# Patient Record
Sex: FEMALE | Race: WHITE | NOT HISPANIC OR LATINO | Employment: OTHER | URBAN - METROPOLITAN AREA
[De-identification: names, ages, dates, MRNs, and addresses within clinical notes are randomized per-mention and may not be internally consistent; named-entity substitution may affect disease eponyms.]

---

## 2019-03-05 NOTE — H&P (VIEW-ONLY)
Electrophysiology Consult     Tania Schreiber  1940  [unfilled]  [unfilled]    03/06/19    DATE OF ADMISSION: (Not on file)  [facility] CARDIOLOGY    Madera, Joe Renteria MD  [address] [place] [id number]    Chief Complaint   Patient presents with   • Atrial Fibrillation       Problem List:  1. Persistent atrial fibrillation  a. CHADSVASc = 4 (age >75, female, HTN) on Eliquis  b. S/p ISABELA/ECV, 10/2016  c. ISABELA 10/27/16: EF 45-50%, mild to moderate MR, moderate TR  d. Previously treated with amiodarone, discontinued secondary to thyroid toxicity  e. Echocardiogram 4/12/17: EF 60-65%, no significant valvular disease  2. Abnormal MPS:  a. MPS 10/24/16: EF 63%, medium, completely reversible, mid to basal-anteroseptal defect  b. Left heart catheterization 10/25/16: No significant coronary artery disease  3. Hypertension  4. Hyperlipidemia  5. Hypothyroidism  6. GERD  7. Surgical history:  a. Left knee surgery  b. Bilateral cataract surgery  c. Jaw tumor removal  d. Tonsillectomy  e. Cholecystectomy      History of Present Illness:  Patient is an 78 year old  female with the above noted past medical history who presents today in consultation by referral from Dr. Argueta for further evaluation and management of persistent atrial fibrillation.  She is s/p ECV in 2016 and was treated with amiodarone in the past.  This was discontinued secondary to thyroid toxicity.  She has now been treated with Multaq but continues to have episodes of symptomatic atrial fibrillation.  Symptoms include palpitations, pressure in the chest, fatigue and dyspnea on exertion, described as moderate to severe in nature, exacerbated by activity, and relieved by rest and restoration of normal rhythm.  Believes she has been out of rhythm for at least the last week and a half.  Of note, most recent echocardiogram in 2017 showing normal EF and no significant valvular disease.  LHC in 2016 showing no coronary  artery disease.  Anticoagulated with Eliquis with no bleeding issues or hx of TIA/CVA.  She is a non-smoker, 2 cups of coffee, 1 soda daily, no ETOH.  She reports stable thyroid levels on meds.  Reports labile BP's lately.  Dizziness with low BP's, no syncope.  No hx of ABHINAV - reports negative sleep study in the past.       Allergies   Allergen Reactions   • Diphenhydramine Other (See Comments)        Cannot display prior to admission medications because the patient has not been admitted in this contact.            Current Outpatient Medications:   •  ALPRAZolam (XANAX) 0.5 MG tablet, Take 0.5 mg by mouth 2 (Two) Times a Day As Needed for Anxiety., Disp: , Rfl:   •  apixaban (ELIQUIS) 5 MG tablet tablet, Take 5 mg by mouth 2 (Two) Times a Day., Disp: , Rfl:   •  atorvastatin (LIPITOR) 20 MG tablet, Take 20 mg by mouth Daily., Disp: , Rfl:   •  doxazosin (CARDURA) 4 MG tablet, Take 4 mg by mouth Every Night., Disp: , Rfl:   •  dronedarone (MULTAQ) 400 MG tablet, Take 400 mg by mouth 2 (Two) Times a Day With Meals., Disp: , Rfl:   •  hydrochlorothiazide (HYDRODIURIL) 12.5 MG tablet, Take 12.5 mg by mouth 2 (Two) Times a Day., Disp: , Rfl:   •  lansoprazole (PREVACID) 30 MG capsule, Take 30 mg by mouth Daily., Disp: , Rfl:   •  levothyroxine (SYNTHROID, LEVOTHROID) 50 MCG tablet, Take 50 mcg by mouth Daily., Disp: , Rfl:   •  lisinopril (PRINIVIL,ZESTRIL) 20 MG tablet, Take 30 mg by mouth 2 (Two) Times a Day., Disp: , Rfl:   •  metoprolol tartrate (LOPRESSOR) 25 MG tablet, Take 12.5 mg by mouth 2 (Two) Times a Day., Disp: , Rfl:   •  Multiple Vitamins-Minerals (PRESERVISION AREDS PO), Take 2 tablets by mouth Daily., Disp: , Rfl:     Social History     Socioeconomic History   • Marital status:      Spouse name: Not on file   • Number of children: Not on file   • Years of education: Not on file   • Highest education level: Not on file   Tobacco Use   • Smoking status: Never Smoker   Substance and Sexual Activity  "  • Alcohol use: No     Frequency: Never   • Drug use: No     Family History:  Mother: , [place]  Father: , [place]      REVIEW OF SYSTEMS:   CONST:  No weight loss, fever, chills, weakness + fatigue.   HEENT:  No visual loss, blurred vision, double vision, yellow sclerae.                   No hearing loss, congestion, sore throat.   SKIN:      No rashes, urticaria, ulcers, sores.     RESP:     + shortness of breath, hemoptysis, cough, sputum.   GI:           No anorexia, nausea, vomiting, diarrhea. No abdominal pain, melena.   :         No burning on urination, hematuria or increased frequency.  ENDO:    No diaphoresis, cold or heat intolerance. No polyuria or polydipsia.   NEURO:  No headache, + dizziness, no syncope, paralysis, ataxia, or parasthesias.                  No change in bowel or bladder control. No history of CVA/TIA  MUSC:    No muscle, back pain, joint pain or stiffness.   HEME:    No anemia, bleeding, bruising. No history of DVT/PE.  PSYCH:  No history of depression, anxiety    Vitals:    19 1302   BP: 122/72   BP Location: Right arm   Patient Position: Sitting   Pulse: 103   Weight: 69.9 kg (154 lb)   Height: 160 cm (63\")                 Physical Exam:  GEN: Well nourished, well-developed, no acute distress  HEENT: Normocephalic, atraumatic, PERRLA, moist mucous membranes  NECK: Supple, NO JVD, no thyromegaly, no lymphadenopathy  CARD: S1S2, irreg irreg rate and rhythm, 3/6 systolic ejection murmur, no gallop, rub, PMI NL  LUNGS: Clear to auscultation, normal respiratory effort  ABDOMEN: Soft, nontender, normal bowel sounds  EXTREMITIES: No gross deformities, no clubbing, cyanosis, or edema  SKIN: Warm, dry, no lesions  NEURO: No focal deficits, alert and oriented x 3  PSYCHIATRIC: Normal affect and mood      I personally viewed and interpreted the patient's EKG/Telemetry/lab data      ECG 12 Lead  Date/Time: 3/6/2019 1:16 PM  Performed by: Parth Almazan MD  Authorized by: " Parth Amlazan MD   Comparison: not compared with previous ECG   Previous ECG: no previous ECG available  Rhythm: atrial fibrillation  BPM: 103                ICD-10-CM ICD-9-CM   1. Persistent atrial fibrillation (CMS/HCC) I48.1 427.31   2. Essential hypertension I10 401.9   3. VHD (valvular heart disease) I38 424.90       Assessment and Plan:   1. Persistent atrial fibrillation:  - Recurrent, symptomatic persistent atrial fibrillation with RVR.  She had side effects and thyroid toxicity to amiodarone and has failed medical therapy with Multaq.  Options include Tikosyn versus AVN/PPM implant.  Discussed probable success rates and need for hospitalization with Tikosyn +/- ECV on the last day.  She will check on cost of Tikosyn and let us know.    - CHADSVASc = 4, on Eliquis  - Will repeat echo today as last echo 2 years ago and murmur noted today.    2. HTN:  - Frequent, low BP's at home with symptoms  - Stop Cardura    3. VHD: needs echo    Scribed for Parth Almazan MD by Netta Winston, APRN. 3/6/2019  1:49 PM     I, Parth Almazan MD, personally performed the services described in this documentation as scribed by the above named individual in my presence, and it is both accurate and complete.  3/6/2019  1:49 PM

## 2019-03-05 NOTE — PROGRESS NOTES
Electrophysiology Consult     Tania Schreiber  1940  [unfilled]  [unfilled]    03/06/19    DATE OF ADMISSION: (Not on file)  [facility] CARDIOLOGY    Madera, Joe Renteria MD  [address] [place] [address]    Chief Complaint   Patient presents with   • Atrial Fibrillation       Problem List:  1. Persistent atrial fibrillation  a. CHADSVASc = 4 (age >75, female, HTN) on Eliquis  b. S/p ISABELA/ECV, 10/2016  c. ISABELA 10/27/16: EF 45-50%, mild to moderate MR, moderate TR  d. Previously treated with amiodarone, discontinued secondary to thyroid toxicity  e. Echocardiogram 4/12/17: EF 60-65%, no significant valvular disease  2. Abnormal MPS:  a. MPS 10/24/16: EF 63%, medium, completely reversible, mid to basal-anteroseptal defect  b. Left heart catheterization 10/25/16: No significant coronary artery disease  3. Hypertension  4. Hyperlipidemia  5. Hypothyroidism  6. GERD  7. Surgical history:  a. Left knee surgery  b. Bilateral cataract surgery  c. Jaw tumor removal  d. Tonsillectomy  e. Cholecystectomy      History of Present Illness:  Patient is an 78 year old  female with the above noted past medical history who presents today in consultation by referral from Dr. Argueta for further evaluation and management of persistent atrial fibrillation.  She is s/p ECV in 2016 and was treated with amiodarone in the past.  This was discontinued secondary to thyroid toxicity.  She has now been treated with Multaq but continues to have episodes of symptomatic atrial fibrillation.  Symptoms include palpitations, pressure in the chest, fatigue and dyspnea on exertion, described as moderate to severe in nature, exacerbated by activity, and relieved by rest and restoration of normal rhythm.  Believes she has been out of rhythm for at least the last week and a half.  Of note, most recent echocardiogram in 2017 showing normal EF and no significant valvular disease.  LHC in 2016 showing no coronary  artery disease.  Anticoagulated with Eliquis with no bleeding issues or hx of TIA/CVA.  She is a non-smoker, 2 cups of coffee, 1 soda daily, no ETOH.  She reports stable thyroid levels on meds.  Reports labile BP's lately.  Dizziness with low BP's, no syncope.  No hx of ABHINAV - reports negative sleep study in the past.       Allergies   Allergen Reactions   • Diphenhydramine Other (See Comments)        Cannot display prior to admission medications because the patient has not been admitted in this contact.            Current Outpatient Medications:   •  ALPRAZolam (XANAX) 0.5 MG tablet, Take 0.5 mg by mouth 2 (Two) Times a Day As Needed for Anxiety., Disp: , Rfl:   •  apixaban (ELIQUIS) 5 MG tablet tablet, Take 5 mg by mouth 2 (Two) Times a Day., Disp: , Rfl:   •  atorvastatin (LIPITOR) 20 MG tablet, Take 20 mg by mouth Daily., Disp: , Rfl:   •  doxazosin (CARDURA) 4 MG tablet, Take 4 mg by mouth Every Night., Disp: , Rfl:   •  dronedarone (MULTAQ) 400 MG tablet, Take 400 mg by mouth 2 (Two) Times a Day With Meals., Disp: , Rfl:   •  hydrochlorothiazide (HYDRODIURIL) 12.5 MG tablet, Take 12.5 mg by mouth 2 (Two) Times a Day., Disp: , Rfl:   •  lansoprazole (PREVACID) 30 MG capsule, Take 30 mg by mouth Daily., Disp: , Rfl:   •  levothyroxine (SYNTHROID, LEVOTHROID) 50 MCG tablet, Take 50 mcg by mouth Daily., Disp: , Rfl:   •  lisinopril (PRINIVIL,ZESTRIL) 20 MG tablet, Take 30 mg by mouth 2 (Two) Times a Day., Disp: , Rfl:   •  metoprolol tartrate (LOPRESSOR) 25 MG tablet, Take 12.5 mg by mouth 2 (Two) Times a Day., Disp: , Rfl:   •  Multiple Vitamins-Minerals (PRESERVISION AREDS PO), Take 2 tablets by mouth Daily., Disp: , Rfl:     Social History     Socioeconomic History   • Marital status:      Spouse name: Not on file   • Number of children: Not on file   • Years of education: Not on file   • Highest education level: Not on file   Tobacco Use   • Smoking status: Never Smoker   Substance and Sexual Activity  "  • Alcohol use: No     Frequency: Never   • Drug use: No     Family History:  Mother: , [place]  Father: , [place]      REVIEW OF SYSTEMS:   CONST:  No weight loss, fever, chills, weakness + fatigue.   HEENT:  No visual loss, blurred vision, double vision, yellow sclerae.                   No hearing loss, congestion, sore throat.   SKIN:      No rashes, urticaria, ulcers, sores.     RESP:     + shortness of breath, hemoptysis, cough, sputum.   GI:           No anorexia, nausea, vomiting, diarrhea. No abdominal pain, melena.   :         No burning on urination, hematuria or increased frequency.  ENDO:    No diaphoresis, cold or heat intolerance. No polyuria or polydipsia.   NEURO:  No headache, + dizziness, no syncope, paralysis, ataxia, or parasthesias.                  No change in bowel or bladder control. No history of CVA/TIA  MUSC:    No muscle, back pain, joint pain or stiffness.   HEME:    No anemia, bleeding, bruising. No history of DVT/PE.  PSYCH:  No history of depression, anxiety    Vitals:    19 1302   BP: 122/72   BP Location: Right arm   Patient Position: Sitting   Pulse: 103   Weight: 69.9 kg (154 lb)   Height: 160 cm (63\")                 Physical Exam:  GEN: Well nourished, well-developed, no acute distress  HEENT: Normocephalic, atraumatic, PERRLA, moist mucous membranes  NECK: Supple, NO JVD, no thyromegaly, no lymphadenopathy  CARD: S1S2, irreg irreg rate and rhythm, 3/6 systolic ejection murmur, no gallop, rub, PMI NL  LUNGS: Clear to auscultation, normal respiratory effort  ABDOMEN: Soft, nontender, normal bowel sounds  EXTREMITIES: No gross deformities, no clubbing, cyanosis, or edema  SKIN: Warm, dry, no lesions  NEURO: No focal deficits, alert and oriented x 3  PSYCHIATRIC: Normal affect and mood      I personally viewed and interpreted the patient's EKG/Telemetry/lab data      ECG 12 Lead  Date/Time: 3/6/2019 1:16 PM  Performed by: Parth Almazan MD  Authorized by: " Parth Almazan MD   Comparison: not compared with previous ECG   Previous ECG: no previous ECG available  Rhythm: atrial fibrillation  BPM: 103                ICD-10-CM ICD-9-CM   1. Persistent atrial fibrillation (CMS/HCC) I48.1 427.31   2. Essential hypertension I10 401.9   3. VHD (valvular heart disease) I38 424.90       Assessment and Plan:   1. Persistent atrial fibrillation:  - Recurrent, symptomatic persistent atrial fibrillation with RVR.  She had side effects and thyroid toxicity to amiodarone and has failed medical therapy with Multaq.  Options include Tikosyn versus AVN/PPM implant.  Discussed probable success rates and need for hospitalization with Tikosyn +/- ECV on the last day.  She will check on cost of Tikosyn and let us know.    - CHADSVASc = 4, on Eliquis  - Will repeat echo today as last echo 2 years ago and murmur noted today.    2. HTN:  - Frequent, low BP's at home with symptoms  - Stop Cardura    3. VHD: needs echo    Scribed for Parth Almazan MD by Netta Winston, APRN. 3/6/2019  1:49 PM     I, Parth Almazan MD, personally performed the services described in this documentation as scribed by the above named individual in my presence, and it is both accurate and complete.  3/6/2019  1:49 PM

## 2019-03-06 ENCOUNTER — HOSPITAL ENCOUNTER (OUTPATIENT)
Dept: CARDIOLOGY | Facility: HOSPITAL | Age: 79
Discharge: HOME | End: 2019-03-06
Admitting: NURSE PRACTITIONER

## 2019-03-06 ENCOUNTER — CONSULT (OUTPATIENT)
Dept: CARDIOLOGY | Facility: CLINIC | Age: 79
End: 2019-03-06

## 2019-03-06 VITALS — WEIGHT: 154 LBS | HEIGHT: 63 IN | BODY MASS INDEX: 27.29 KG/M2

## 2019-03-06 VITALS
WEIGHT: 154 LBS | HEIGHT: 63 IN | BODY MASS INDEX: 27.29 KG/M2 | DIASTOLIC BLOOD PRESSURE: 72 MMHG | HEART RATE: 103 BPM | SYSTOLIC BLOOD PRESSURE: 122 MMHG

## 2019-03-06 DIAGNOSIS — I10 ESSENTIAL HYPERTENSION: ICD-10-CM

## 2019-03-06 DIAGNOSIS — I48.19 PERSISTENT ATRIAL FIBRILLATION (HCC): Primary | ICD-10-CM

## 2019-03-06 DIAGNOSIS — I38 VHD (VALVULAR HEART DISEASE): ICD-10-CM

## 2019-03-06 PROBLEM — E78.5 HYPERLIPIDEMIA: Status: ACTIVE | Noted: 2019-03-06

## 2019-03-06 LAB
BH CV ECHO MEAS - AO MAX PG (FULL): 15.3 MMHG
BH CV ECHO MEAS - AO MAX PG: 20.4 MMHG
BH CV ECHO MEAS - AO MEAN PG (FULL): 8.9 MMHG
BH CV ECHO MEAS - AO MEAN PG: 11.6 MMHG
BH CV ECHO MEAS - AO ROOT AREA (BSA CORRECTED): 1.6
BH CV ECHO MEAS - AO ROOT AREA: 5.8 CM^2
BH CV ECHO MEAS - AO ROOT DIAM: 2.7 CM
BH CV ECHO MEAS - AO V2 MAX: 214.1 CM/SEC
BH CV ECHO MEAS - AO V2 MEAN: 167.9 CM/SEC
BH CV ECHO MEAS - AO V2 VTI: 45.7 CM
BH CV ECHO MEAS - AVA(I,A): 1.5 CM^2
BH CV ECHO MEAS - AVA(I,D): 1.5 CM^2
BH CV ECHO MEAS - AVA(V,A): 1.7 CM^2
BH CV ECHO MEAS - AVA(V,D): 1.7 CM^2
BH CV ECHO MEAS - BSA(HAYCOCK): 1.8 M^2
BH CV ECHO MEAS - BSA: 1.7 M^2
BH CV ECHO MEAS - BZI_BMI: 27.3 KILOGRAMS/M^2
BH CV ECHO MEAS - BZI_METRIC_HEIGHT: 160 CM
BH CV ECHO MEAS - BZI_METRIC_WEIGHT: 69.9 KG
BH CV ECHO MEAS - EDV(CUBED): 45.4 ML
BH CV ECHO MEAS - EDV(MOD-SP2): 45 ML
BH CV ECHO MEAS - EDV(MOD-SP4): 54 ML
BH CV ECHO MEAS - EDV(TEICH): 53.2 ML
BH CV ECHO MEAS - EF(CUBED): 62.7 %
BH CV ECHO MEAS - EF(MOD-BP): 53 %
BH CV ECHO MEAS - EF(MOD-SP2): 55.6 %
BH CV ECHO MEAS - EF(MOD-SP4): 51.9 %
BH CV ECHO MEAS - EF(TEICH): 55.2 %
BH CV ECHO MEAS - ESV(CUBED): 16.9 ML
BH CV ECHO MEAS - ESV(MOD-SP2): 20 ML
BH CV ECHO MEAS - ESV(MOD-SP4): 26 ML
BH CV ECHO MEAS - ESV(TEICH): 23.8 ML
BH CV ECHO MEAS - FS: 28 %
BH CV ECHO MEAS - IVS/LVPW: 0.85
BH CV ECHO MEAS - IVSD: 0.83 CM
BH CV ECHO MEAS - LA DIMENSION: 4 CM
BH CV ECHO MEAS - LA/AO: 1.5
BH CV ECHO MEAS - LAD MAJOR: 5.1 CM
BH CV ECHO MEAS - LAT PEAK E' VEL: 12 CM/SEC
BH CV ECHO MEAS - LATERAL E/E' RATIO: 7.9
BH CV ECHO MEAS - LV DIASTOLIC VOL/BSA (35-75): 31.2 ML/M^2
BH CV ECHO MEAS - LV MASS(C)D: 92.1 GRAMS
BH CV ECHO MEAS - LV MASS(C)DI: 53.2 GRAMS/M^2
BH CV ECHO MEAS - LV MAX PG: 5.1 MMHG
BH CV ECHO MEAS - LV MEAN PG: 2.6 MMHG
BH CV ECHO MEAS - LV SYSTOLIC VOL/BSA (12-30): 15 ML/M^2
BH CV ECHO MEAS - LV V1 MAX: 113 CM/SEC
BH CV ECHO MEAS - LV V1 MEAN: 75.1 CM/SEC
BH CV ECHO MEAS - LV V1 VTI: 21.8 CM
BH CV ECHO MEAS - LVIDD: 3.6 CM
BH CV ECHO MEAS - LVIDS: 2.6 CM
BH CV ECHO MEAS - LVLD AP2: 6.3 CM
BH CV ECHO MEAS - LVLD AP4: 6.3 CM
BH CV ECHO MEAS - LVLS AP2: 5.2 CM
BH CV ECHO MEAS - LVLS AP4: 5.6 CM
BH CV ECHO MEAS - LVOT AREA (M): 3.1 CM^2
BH CV ECHO MEAS - LVOT AREA: 3.2 CM^2
BH CV ECHO MEAS - LVOT DIAM: 2 CM
BH CV ECHO MEAS - LVPWD: 0.98 CM
BH CV ECHO MEAS - MED PEAK E' VEL: 7.6 CM/SEC
BH CV ECHO MEAS - MEDIAL E/E' RATIO: 12.5
BH CV ECHO MEAS - MR ALIAS VEL: 38.5 CM/SEC
BH CV ECHO MEAS - MR FLOW RATE: 31 CM^3/SEC
BH CV ECHO MEAS - MR PISA RADIUS: 0.36 CM
BH CV ECHO MEAS - MR PISA: 0.8 CM^2
BH CV ECHO MEAS - MV AREA (1 DIAM): 5.8 CM^2
BH CV ECHO MEAS - MV DEC TIME: 0.25 SEC
BH CV ECHO MEAS - MV DIAM: 2.7 CM
BH CV ECHO MEAS - MV E MAX VEL: 96.3 CM/SEC
BH CV ECHO MEAS - MV FLOW AREA(1DIAM): 5.8 CM^2
BH CV ECHO MEAS - MV MAX PG: 7.7 MMHG
BH CV ECHO MEAS - MV MEAN PG: 2.1 MMHG
BH CV ECHO MEAS - MV V2 MAX: 138.8 CM/SEC
BH CV ECHO MEAS - MV V2 MEAN: 64.9 CM/SEC
BH CV ECHO MEAS - MV V2 VTI: 28.5 CM
BH CV ECHO MEAS - MVA(VTI): 2.4 CM^2
BH CV ECHO MEAS - PA ACC SLOPE: 430.2 CM/SEC^2
BH CV ECHO MEAS - PA ACC TIME: 0.17 SEC
BH CV ECHO MEAS - PA MAX PG: 2.6 MMHG
BH CV ECHO MEAS - PA PR(ACCEL): 4.1 MMHG
BH CV ECHO MEAS - PA V2 MAX: 80.9 CM/SEC
BH CV ECHO MEAS - RAP SYSTOLE: 3 MMHG
BH CV ECHO MEAS - RF(MV,AO)(1 DIAM): -0.62
BH CV ECHO MEAS - RF(MV,LVOT)(1DIAM): 0.58
BH CV ECHO MEAS - RVSP: 22.4 MMHG
BH CV ECHO MEAS - SI(AO): 154.2 ML/M^2
BH CV ECHO MEAS - SI(CUBED): 16.4 ML/M^2
BH CV ECHO MEAS - SI(LVOT): 40.1 ML/M^2
BH CV ECHO MEAS - SI(MOD-SP2): 14.4 ML/M^2
BH CV ECHO MEAS - SI(MOD-SP4): 16.2 ML/M^2
BH CV ECHO MEAS - SI(MV 1 DIAM): 95.2 ML/M^2
BH CV ECHO MEAS - SI(TEICH): 17 ML/M^2
BH CV ECHO MEAS - SV(AO): 266.8 ML
BH CV ECHO MEAS - SV(CUBED): 28.4 ML
BH CV ECHO MEAS - SV(LVOT): 69.4 ML
BH CV ECHO MEAS - SV(MOD-SP2): 25 ML
BH CV ECHO MEAS - SV(MOD-SP4): 28 ML
BH CV ECHO MEAS - SV(MV 1 DIAM): 164.7 ML
BH CV ECHO MEAS - SV(TEICH): 29.4 ML
BH CV ECHO MEAS - TAPSE (>1.6): 1.6 CM2
BH CV ECHO MEAS - TR MAX PG: 19.4 MMHG
BH CV ECHO MEAS - TR MAX VEL: 219.5 CM/SEC
BH CV ECHO MEASUREMENTS AVERAGE E/E' RATIO: 9.83
BH CV VAS BP LEFT ARM: NORMAL MMHG
BH CV XLRA - RV BASE: 3 CM
BH CV XLRA - RV LENGTH: 6.5 CM
BH CV XLRA - RV MID: 2.2 CM
BH CV XLRA - TDI S': 11.3 CM/SEC
LEFT ATRIUM VOLUME INDEX: 33.5 ML/M^2
LEFT ATRIUM VOLUME: 58 ML
MV VENA CONTRACTA: 0.4 CM

## 2019-03-06 PROCEDURE — 99204 OFFICE O/P NEW MOD 45 MIN: CPT | Performed by: INTERNAL MEDICINE

## 2019-03-06 PROCEDURE — 93000 ELECTROCARDIOGRAM COMPLETE: CPT | Performed by: INTERNAL MEDICINE

## 2019-03-06 PROCEDURE — 93306 TTE W/DOPPLER COMPLETE: CPT | Performed by: INTERNAL MEDICINE

## 2019-03-06 PROCEDURE — 93306 TTE W/DOPPLER COMPLETE: CPT

## 2019-03-06 RX ORDER — DOXAZOSIN MESYLATE 4 MG/1
4 TABLET ORAL NIGHTLY
COMMUNITY
End: 2019-03-25

## 2019-03-06 RX ORDER — LANSOPRAZOLE 30 MG/1
30 CAPSULE, DELAYED RELEASE ORAL EVERY MORNING
COMMUNITY

## 2019-03-06 RX ORDER — HYDROCHLOROTHIAZIDE 12.5 MG/1
12.5 TABLET ORAL 2 TIMES DAILY
COMMUNITY
End: 2019-03-27 | Stop reason: HOSPADM

## 2019-03-06 RX ORDER — LISINOPRIL 20 MG/1
30 TABLET ORAL 2 TIMES DAILY
COMMUNITY
End: 2019-03-25

## 2019-03-06 RX ORDER — ALPRAZOLAM 0.5 MG/1
.25-.5 TABLET ORAL 2 TIMES DAILY PRN
COMMUNITY

## 2019-03-06 RX ORDER — ATORVASTATIN CALCIUM 20 MG/1
20 TABLET, FILM COATED ORAL NIGHTLY
COMMUNITY

## 2019-03-06 RX ORDER — LEVOTHYROXINE SODIUM 0.05 MG/1
50 TABLET ORAL NIGHTLY
COMMUNITY

## 2019-03-08 DIAGNOSIS — I49.5 TACHY-BRADY SYNDROME (HCC): Primary | ICD-10-CM

## 2019-03-11 PROBLEM — I49.5 TACHY-BRADY SYNDROME: Status: ACTIVE | Noted: 2019-03-11

## 2019-03-13 ENCOUNTER — PREP FOR SURGERY (OUTPATIENT)
Dept: OTHER | Facility: HOSPITAL | Age: 79
End: 2019-03-13

## 2019-03-13 DIAGNOSIS — I48.19 PERSISTENT ATRIAL FIBRILLATION (HCC): ICD-10-CM

## 2019-03-13 DIAGNOSIS — I49.5 TACHY-BRADY SYNDROME (HCC): Primary | ICD-10-CM

## 2019-03-13 RX ORDER — ONDANSETRON 2 MG/ML
4 INJECTION INTRAMUSCULAR; INTRAVENOUS EVERY 6 HOURS PRN
Status: CANCELLED | OUTPATIENT
Start: 2019-03-13

## 2019-03-13 RX ORDER — SODIUM CHLORIDE 0.9 % (FLUSH) 0.9 %
3 SYRINGE (ML) INJECTION EVERY 12 HOURS SCHEDULED
Status: CANCELLED | OUTPATIENT
Start: 2019-03-13

## 2019-03-13 RX ORDER — NITROGLYCERIN 0.4 MG/1
0.4 TABLET SUBLINGUAL
Status: CANCELLED | OUTPATIENT
Start: 2019-03-13

## 2019-03-13 RX ORDER — SODIUM CHLORIDE 9 MG/ML
1 INJECTION, SOLUTION INTRAVENOUS CONTINUOUS
Status: CANCELLED | OUTPATIENT
Start: 2019-03-13 | End: 2019-03-13

## 2019-03-13 RX ORDER — PROMETHAZINE HYDROCHLORIDE 25 MG/ML
12.5 INJECTION, SOLUTION INTRAMUSCULAR; INTRAVENOUS EVERY 4 HOURS PRN
Status: CANCELLED | OUTPATIENT
Start: 2019-03-13

## 2019-03-13 RX ORDER — ACETAMINOPHEN 325 MG/1
650 TABLET ORAL EVERY 4 HOURS PRN
Status: CANCELLED | OUTPATIENT
Start: 2019-03-13

## 2019-03-13 RX ORDER — CEFAZOLIN SODIUM 2 G/100ML
2 INJECTION, SOLUTION INTRAVENOUS ONCE
Status: CANCELLED | OUTPATIENT
Start: 2019-03-13

## 2019-03-13 RX ORDER — SODIUM CHLORIDE 0.9 % (FLUSH) 0.9 %
1-10 SYRINGE (ML) INJECTION AS NEEDED
Status: CANCELLED | OUTPATIENT
Start: 2019-03-13

## 2019-03-25 ENCOUNTER — APPOINTMENT (OUTPATIENT)
Dept: PREADMISSION TESTING | Facility: HOSPITAL | Age: 79
End: 2019-03-25

## 2019-03-25 DIAGNOSIS — I48.19 PERSISTENT ATRIAL FIBRILLATION (HCC): ICD-10-CM

## 2019-03-25 DIAGNOSIS — I49.5 TACHY-BRADY SYNDROME (HCC): ICD-10-CM

## 2019-03-25 LAB
ANION GAP SERPL CALCULATED.3IONS-SCNC: 8 MMOL/L (ref 3–11)
BUN BLD-MCNC: 31 MG/DL (ref 9–23)
BUN/CREAT SERPL: 15.5 (ref 7–25)
CALCIUM SPEC-SCNC: 9.3 MG/DL (ref 8.7–10.4)
CHLORIDE SERPL-SCNC: 106 MMOL/L (ref 99–109)
CO2 SERPL-SCNC: 26 MMOL/L (ref 20–31)
CREAT BLD-MCNC: 2 MG/DL (ref 0.6–1.3)
DEPRECATED RDW RBC AUTO: 49.2 FL (ref 37–54)
ERYTHROCYTE [DISTWIDTH] IN BLOOD BY AUTOMATED COUNT: 14.6 % (ref 11.3–14.5)
GFR SERPL CREATININE-BSD FRML MDRD: 24 ML/MIN/1.73
GLUCOSE BLD-MCNC: 122 MG/DL (ref 70–100)
HBA1C MFR BLD: 5.6 % (ref 4.8–5.6)
HCT VFR BLD AUTO: 37.1 % (ref 34.5–44)
HGB BLD-MCNC: 11.9 G/DL (ref 11.5–15.5)
INR PPP: 1.29 (ref 0.85–1.16)
MCH RBC QN AUTO: 29.5 PG (ref 27–31)
MCHC RBC AUTO-ENTMCNC: 32.1 G/DL (ref 32–36)
MCV RBC AUTO: 91.8 FL (ref 80–99)
PLATELET # BLD AUTO: 242 10*3/MM3 (ref 150–450)
PMV BLD AUTO: 10.3 FL (ref 6–12)
POTASSIUM BLD-SCNC: 4.1 MMOL/L (ref 3.5–5.5)
PROTHROMBIN TIME: 15.5 SECONDS (ref 11.2–14.3)
RBC # BLD AUTO: 4.04 10*6/MM3 (ref 3.89–5.14)
SODIUM BLD-SCNC: 140 MMOL/L (ref 132–146)
WBC NRBC COR # BLD: 6.39 10*3/MM3 (ref 3.5–10.8)

## 2019-03-25 PROCEDURE — 85027 COMPLETE CBC AUTOMATED: CPT | Performed by: NURSE PRACTITIONER

## 2019-03-25 PROCEDURE — 85610 PROTHROMBIN TIME: CPT | Performed by: NURSE PRACTITIONER

## 2019-03-25 PROCEDURE — 36415 COLL VENOUS BLD VENIPUNCTURE: CPT

## 2019-03-25 PROCEDURE — 80048 BASIC METABOLIC PNL TOTAL CA: CPT | Performed by: NURSE PRACTITIONER

## 2019-03-25 PROCEDURE — 83036 HEMOGLOBIN GLYCOSYLATED A1C: CPT | Performed by: NURSE PRACTITIONER

## 2019-03-25 RX ORDER — FLUTICASONE PROPIONATE 50 MCG
2 SPRAY, SUSPENSION (ML) NASAL DAILY PRN
COMMUNITY

## 2019-03-25 RX ORDER — PROMETHAZINE HYDROCHLORIDE 25 MG/1
12.5 TABLET ORAL EVERY 8 HOURS PRN
COMMUNITY
End: 2019-06-27

## 2019-03-25 RX ORDER — LISINOPRIL 30 MG/1
30 TABLET ORAL 2 TIMES DAILY
COMMUNITY
End: 2019-03-27 | Stop reason: HOSPADM

## 2019-03-26 ENCOUNTER — HOSPITAL ENCOUNTER (OUTPATIENT)
Facility: HOSPITAL | Age: 79
Setting detail: HOSPITAL OUTPATIENT SURGERY
Discharge: HOME | End: 2019-03-27
Attending: INTERNAL MEDICINE | Admitting: INTERNAL MEDICINE

## 2019-03-26 DIAGNOSIS — N17.9 AKI (ACUTE KIDNEY INJURY) (HCC): Primary | ICD-10-CM

## 2019-03-26 DIAGNOSIS — I48.19 PERSISTENT ATRIAL FIBRILLATION (HCC): ICD-10-CM

## 2019-03-26 DIAGNOSIS — I49.5 TACHY-BRADY SYNDROME (HCC): ICD-10-CM

## 2019-03-26 PROCEDURE — C1892 INTRO/SHEATH,FIXED,PEEL-AWAY: HCPCS | Performed by: INTERNAL MEDICINE

## 2019-03-26 PROCEDURE — 33208 INSRT HEART PM ATRIAL & VENT: CPT | Performed by: INTERNAL MEDICINE

## 2019-03-26 PROCEDURE — C1894 INTRO/SHEATH, NON-LASER: HCPCS | Performed by: INTERNAL MEDICINE

## 2019-03-26 PROCEDURE — C1733 CATH, EP, OTHR THAN COOL-TIP: HCPCS | Performed by: INTERNAL MEDICINE

## 2019-03-26 PROCEDURE — G0378 HOSPITAL OBSERVATION PER HR: HCPCS

## 2019-03-26 PROCEDURE — C1785 PMKR, DUAL, RATE-RESP: HCPCS | Performed by: INTERNAL MEDICINE

## 2019-03-26 PROCEDURE — 99152 MOD SED SAME PHYS/QHP 5/>YRS: CPT | Performed by: INTERNAL MEDICINE

## 2019-03-26 PROCEDURE — 25010000002 CEFAZOLIN PER 500 MG: Performed by: INTERNAL MEDICINE

## 2019-03-26 PROCEDURE — C1898 LEAD, PMKR, OTHER THAN TRANS: HCPCS | Performed by: INTERNAL MEDICINE

## 2019-03-26 PROCEDURE — 93650 ICAR CATH ABLTJ AV NODE FUNC: CPT | Performed by: INTERNAL MEDICINE

## 2019-03-26 PROCEDURE — 25010000003 CEFAZOLIN IN DEXTROSE 2-4 GM/100ML-% SOLUTION: Performed by: NURSE PRACTITIONER

## 2019-03-26 PROCEDURE — 25010000002 ONDANSETRON PER 1 MG: Performed by: INTERNAL MEDICINE

## 2019-03-26 PROCEDURE — 25010000002 FENTANYL CITRATE (PF) 100 MCG/2ML SOLUTION: Performed by: INTERNAL MEDICINE

## 2019-03-26 PROCEDURE — 99153 MOD SED SAME PHYS/QHP EA: CPT | Performed by: INTERNAL MEDICINE

## 2019-03-26 PROCEDURE — 25010000002 MIDAZOLAM PER 1 MG: Performed by: INTERNAL MEDICINE

## 2019-03-26 DEVICE — GEN PM ASSURITY DR RF PM2240 MERLIN: Type: IMPLANTABLE DEVICE | Status: FUNCTIONAL

## 2019-03-26 DEVICE — LD PM TENDRIL STS 6F52CM 2088TC52: Type: IMPLANTABLE DEVICE | Status: FUNCTIONAL

## 2019-03-26 DEVICE — LD PM TENDRIL STS 6F46CM 2088TC46: Type: IMPLANTABLE DEVICE | Status: FUNCTIONAL

## 2019-03-26 RX ORDER — NITROGLYCERIN 0.4 MG/1
0.4 TABLET SUBLINGUAL
Status: DISCONTINUED | OUTPATIENT
Start: 2019-03-26 | End: 2019-03-26 | Stop reason: HOSPADM

## 2019-03-26 RX ORDER — LEVOTHYROXINE SODIUM 0.05 MG/1
50 TABLET ORAL NIGHTLY
Status: DISCONTINUED | OUTPATIENT
Start: 2019-03-26 | End: 2019-03-27 | Stop reason: HOSPADM

## 2019-03-26 RX ORDER — MULTIPLE VITAMINS W/ MINERALS TAB 9MG-400MCG
1 TAB ORAL DAILY
Status: DISCONTINUED | OUTPATIENT
Start: 2019-03-26 | End: 2019-03-27 | Stop reason: HOSPADM

## 2019-03-26 RX ORDER — FLUTICASONE PROPIONATE 50 MCG
2 SPRAY, SUSPENSION (ML) NASAL DAILY PRN
Status: DISCONTINUED | OUTPATIENT
Start: 2019-03-26 | End: 2019-03-27 | Stop reason: HOSPADM

## 2019-03-26 RX ORDER — HYDROCODONE BITARTRATE AND ACETAMINOPHEN 5; 325 MG/1; MG/1
1 TABLET ORAL EVERY 4 HOURS PRN
Status: DISCONTINUED | OUTPATIENT
Start: 2019-03-26 | End: 2019-03-27 | Stop reason: HOSPADM

## 2019-03-26 RX ORDER — ACETAMINOPHEN 650 MG/1
650 SUPPOSITORY RECTAL EVERY 4 HOURS PRN
Status: DISCONTINUED | OUTPATIENT
Start: 2019-03-26 | End: 2019-03-26

## 2019-03-26 RX ORDER — CEFAZOLIN SODIUM 2 G/100ML
2 INJECTION, SOLUTION INTRAVENOUS EVERY 12 HOURS
Status: COMPLETED | OUTPATIENT
Start: 2019-03-26 | End: 2019-03-27

## 2019-03-26 RX ORDER — PANTOPRAZOLE SODIUM 40 MG/1
40 TABLET, DELAYED RELEASE ORAL EVERY MORNING
Status: DISCONTINUED | OUTPATIENT
Start: 2019-03-27 | End: 2019-03-27 | Stop reason: HOSPADM

## 2019-03-26 RX ORDER — SODIUM CHLORIDE 0.9 % (FLUSH) 0.9 %
3-10 SYRINGE (ML) INJECTION AS NEEDED
Status: DISCONTINUED | OUTPATIENT
Start: 2019-03-26 | End: 2019-03-27 | Stop reason: HOSPADM

## 2019-03-26 RX ORDER — ONDANSETRON 2 MG/ML
4 INJECTION INTRAMUSCULAR; INTRAVENOUS EVERY 6 HOURS PRN
Status: DISCONTINUED | OUTPATIENT
Start: 2019-03-26 | End: 2019-03-27 | Stop reason: HOSPADM

## 2019-03-26 RX ORDER — SODIUM CHLORIDE 9 MG/ML
1 INJECTION, SOLUTION INTRAVENOUS CONTINUOUS
Status: ACTIVE | OUTPATIENT
Start: 2019-03-26 | End: 2019-03-26

## 2019-03-26 RX ORDER — ALPRAZOLAM 0.25 MG/1
0.25 TABLET ORAL 2 TIMES DAILY PRN
Status: DISCONTINUED | OUTPATIENT
Start: 2019-03-26 | End: 2019-03-27 | Stop reason: HOSPADM

## 2019-03-26 RX ORDER — BUPIVACAINE HYDROCHLORIDE 5 MG/ML
INJECTION, SOLUTION PERINEURAL AS NEEDED
Status: DISCONTINUED | OUTPATIENT
Start: 2019-03-26 | End: 2019-03-26 | Stop reason: HOSPADM

## 2019-03-26 RX ORDER — LIDOCAINE HYDROCHLORIDE 10 MG/ML
INJECTION, SOLUTION INFILTRATION; PERINEURAL AS NEEDED
Status: DISCONTINUED | OUTPATIENT
Start: 2019-03-26 | End: 2019-03-26 | Stop reason: HOSPADM

## 2019-03-26 RX ORDER — ACETAMINOPHEN 160 MG/5ML
650 SOLUTION ORAL EVERY 4 HOURS PRN
Status: DISCONTINUED | OUTPATIENT
Start: 2019-03-26 | End: 2019-03-27 | Stop reason: HOSPADM

## 2019-03-26 RX ORDER — PROMETHAZINE HYDROCHLORIDE 25 MG/ML
12.5 INJECTION, SOLUTION INTRAMUSCULAR; INTRAVENOUS EVERY 4 HOURS PRN
Status: DISCONTINUED | OUTPATIENT
Start: 2019-03-26 | End: 2019-03-26 | Stop reason: HOSPADM

## 2019-03-26 RX ORDER — FENTANYL CITRATE 50 UG/ML
INJECTION, SOLUTION INTRAMUSCULAR; INTRAVENOUS AS NEEDED
Status: DISCONTINUED | OUTPATIENT
Start: 2019-03-26 | End: 2019-03-26 | Stop reason: HOSPADM

## 2019-03-26 RX ORDER — ACETAMINOPHEN 160 MG/5ML
650 SOLUTION ORAL EVERY 4 HOURS PRN
Status: DISCONTINUED | OUTPATIENT
Start: 2019-03-26 | End: 2019-03-26

## 2019-03-26 RX ORDER — CEFAZOLIN SODIUM 2 G/100ML
2 INJECTION, SOLUTION INTRAVENOUS ONCE
Status: COMPLETED | OUTPATIENT
Start: 2019-03-26 | End: 2019-03-26

## 2019-03-26 RX ORDER — SODIUM CHLORIDE 0.9 % (FLUSH) 0.9 %
1-10 SYRINGE (ML) INJECTION AS NEEDED
Status: DISCONTINUED | OUTPATIENT
Start: 2019-03-26 | End: 2019-03-26 | Stop reason: HOSPADM

## 2019-03-26 RX ORDER — ACETAMINOPHEN 325 MG/1
650 TABLET ORAL EVERY 4 HOURS PRN
Status: DISCONTINUED | OUTPATIENT
Start: 2019-03-26 | End: 2019-03-26 | Stop reason: HOSPADM

## 2019-03-26 RX ORDER — ONDANSETRON 2 MG/ML
4 INJECTION INTRAMUSCULAR; INTRAVENOUS EVERY 6 HOURS PRN
Status: DISCONTINUED | OUTPATIENT
Start: 2019-03-26 | End: 2019-03-26 | Stop reason: HOSPADM

## 2019-03-26 RX ORDER — ONDANSETRON 2 MG/ML
INJECTION INTRAMUSCULAR; INTRAVENOUS AS NEEDED
Status: DISCONTINUED | OUTPATIENT
Start: 2019-03-26 | End: 2019-03-26 | Stop reason: HOSPADM

## 2019-03-26 RX ORDER — SODIUM CHLORIDE 0.9 % (FLUSH) 0.9 %
3 SYRINGE (ML) INJECTION EVERY 12 HOURS SCHEDULED
Status: DISCONTINUED | OUTPATIENT
Start: 2019-03-26 | End: 2019-03-26 | Stop reason: HOSPADM

## 2019-03-26 RX ORDER — ATORVASTATIN CALCIUM 20 MG/1
20 TABLET, FILM COATED ORAL NIGHTLY
Status: DISCONTINUED | OUTPATIENT
Start: 2019-03-26 | End: 2019-03-27 | Stop reason: HOSPADM

## 2019-03-26 RX ORDER — SODIUM CHLORIDE 0.9 % (FLUSH) 0.9 %
3 SYRINGE (ML) INJECTION EVERY 12 HOURS SCHEDULED
Status: DISCONTINUED | OUTPATIENT
Start: 2019-03-26 | End: 2019-03-27 | Stop reason: HOSPADM

## 2019-03-26 RX ORDER — ACETAMINOPHEN 325 MG/1
650 TABLET ORAL EVERY 4 HOURS PRN
Status: DISCONTINUED | OUTPATIENT
Start: 2019-03-26 | End: 2019-03-27 | Stop reason: HOSPADM

## 2019-03-26 RX ORDER — ACETAMINOPHEN 325 MG/1
650 TABLET ORAL EVERY 4 HOURS PRN
Status: DISCONTINUED | OUTPATIENT
Start: 2019-03-26 | End: 2019-03-26

## 2019-03-26 RX ORDER — MIDAZOLAM HYDROCHLORIDE 1 MG/ML
INJECTION INTRAMUSCULAR; INTRAVENOUS AS NEEDED
Status: DISCONTINUED | OUTPATIENT
Start: 2019-03-26 | End: 2019-03-26 | Stop reason: HOSPADM

## 2019-03-26 RX ORDER — ACETAMINOPHEN 650 MG/1
650 SUPPOSITORY RECTAL EVERY 4 HOURS PRN
Status: DISCONTINUED | OUTPATIENT
Start: 2019-03-26 | End: 2019-03-27 | Stop reason: HOSPADM

## 2019-03-26 RX ADMIN — CEFAZOLIN SODIUM 2 G: 2 INJECTION, SOLUTION INTRAVENOUS at 12:18

## 2019-03-26 RX ADMIN — LEVOTHYROXINE SODIUM 50 MCG: 50 TABLET ORAL at 21:52

## 2019-03-26 RX ADMIN — HYDROCODONE BITARTRATE AND ACETAMINOPHEN 1 TABLET: 5; 325 TABLET ORAL at 14:37

## 2019-03-26 RX ADMIN — SODIUM CHLORIDE, PRESERVATIVE FREE 3 ML: 5 INJECTION INTRAVENOUS at 21:54

## 2019-03-26 RX ADMIN — ATORVASTATIN CALCIUM 20 MG: 20 TABLET, FILM COATED ORAL at 21:53

## 2019-03-26 RX ADMIN — HYDROCODONE BITARTRATE AND ACETAMINOPHEN 1 TABLET: 5; 325 TABLET ORAL at 21:53

## 2019-03-26 RX ADMIN — DEXTROSE MONOHYDRATE 2 G: 50 INJECTION, SOLUTION INTRAVENOUS at 23:22

## 2019-03-26 NOTE — PLAN OF CARE
Problem: Patient Care Overview  Goal: Plan of Care Review  Outcome: Ongoing (interventions implemented as appropriate)      Problem: Cardiac Rhythm Management Device (Adult)  Goal: Signs and Symptoms of Listed Potential Problems Will be Absent, Minimized or Managed (Cardiac Rhythm Management Device)  Outcome: Ongoing (interventions implemented as appropriate)

## 2019-03-26 NOTE — INTERVAL H&P NOTE
"  H&P reviewed. The patient was examined and there are no changes to the H&P.   Patient with persistent symptomatic atrial fibrillation despite AA therapy.  She most recently has failed medical therapy with Multaq.  She did convert back to normal rhythm on her own a week or two ago with current rates in the 40's.  Normal EF per echo.  Will proceed today with AVN ablation + DDD PPM implant with Dr. Almazan.  She denies any recent fevers, chills or infections.  The risks, benefits, and alternatives of the procedure have been reviewed and the patient wishes to proceed.     Cr 2.0 on PAT labs.  Patient unsure of baseline.  Will try to obtain previous labs from PCP.  Will hold HCTZ and lisinopril and repeat BMP tomorrow am.  She does meet criteria for lower dose Eliquis 2.5 mg bid (age 78, Cr >1.5).  Will readdress if kidney function improves.     Lab Results   Component Value Date    GLUCOSE 122 (H) 03/25/2019    CALCIUM 9.3 03/25/2019     03/25/2019    K 4.1 03/25/2019    CO2 26.0 03/25/2019     03/25/2019    BUN 31 (H) 03/25/2019    CREATININE 2.00 (H) 03/25/2019    EGFRIFNONA 24 (L) 03/25/2019    BCR 15.5 03/25/2019    ANIONGAP 8.0 03/25/2019     Lab Results   Component Value Date    WBC 6.39 03/25/2019    HGB 11.9 03/25/2019    HCT 37.1 03/25/2019    MCV 91.8 03/25/2019     03/25/2019     Lab Results   Component Value Date    INR 1.29 (H) 03/25/2019    PROTIME 15.5 (H) 03/25/2019     /72 (BP Location: Right arm, Patient Position: Lying)   Pulse (!) 37   Temp 98.6 °F (37 °C) (Tympanic)   Resp 16   Ht 162.6 cm (64\")   Wt 70.3 kg (154 lb 15.7 oz)   SpO2 99%   BMI 26.60 kg/m²     Electronically signed by CANDY lCark, 03/26/19, 11:40 AM.        "

## 2019-03-27 ENCOUNTER — APPOINTMENT (OUTPATIENT)
Dept: GENERAL RADIOLOGY | Facility: HOSPITAL | Age: 79
End: 2019-03-27

## 2019-03-27 VITALS
HEART RATE: 80 BPM | TEMPERATURE: 98.6 F | OXYGEN SATURATION: 92 % | RESPIRATION RATE: 16 BRPM | HEIGHT: 64 IN | DIASTOLIC BLOOD PRESSURE: 54 MMHG | WEIGHT: 154.98 LBS | SYSTOLIC BLOOD PRESSURE: 101 MMHG | BODY MASS INDEX: 26.46 KG/M2

## 2019-03-27 LAB
ANION GAP SERPL CALCULATED.3IONS-SCNC: 6 MMOL/L (ref 3–11)
BUN BLD-MCNC: 24 MG/DL (ref 9–23)
BUN/CREAT SERPL: 15.1 (ref 7–25)
CALCIUM SPEC-SCNC: 9.2 MG/DL (ref 8.7–10.4)
CHLORIDE SERPL-SCNC: 107 MMOL/L (ref 99–109)
CO2 SERPL-SCNC: 26 MMOL/L (ref 20–31)
CREAT BLD-MCNC: 1.59 MG/DL (ref 0.6–1.3)
GFR SERPL CREATININE-BSD FRML MDRD: 31 ML/MIN/1.73
GLUCOSE BLD-MCNC: 84 MG/DL (ref 70–100)
POTASSIUM BLD-SCNC: 4.3 MMOL/L (ref 3.5–5.5)
SODIUM BLD-SCNC: 139 MMOL/L (ref 132–146)

## 2019-03-27 PROCEDURE — 80048 BASIC METABOLIC PNL TOTAL CA: CPT | Performed by: NURSE PRACTITIONER

## 2019-03-27 PROCEDURE — 99024 POSTOP FOLLOW-UP VISIT: CPT | Performed by: NURSE PRACTITIONER

## 2019-03-27 PROCEDURE — 71046 X-RAY EXAM CHEST 2 VIEWS: CPT

## 2019-03-27 PROCEDURE — 25010000002 CEFAZOLIN PER 500 MG: Performed by: INTERNAL MEDICINE

## 2019-03-27 RX ORDER — DOXAZOSIN MESYLATE 4 MG/1
4 TABLET ORAL NIGHTLY
Start: 2019-03-27 | End: 2019-04-04 | Stop reason: SDUPTHER

## 2019-03-27 RX ORDER — FUROSEMIDE 20 MG/1
20 TABLET ORAL DAILY PRN
Start: 2019-03-27 | End: 2019-04-04 | Stop reason: SDUPTHER

## 2019-03-27 RX ADMIN — HYDROCODONE BITARTRATE AND ACETAMINOPHEN 1 TABLET: 5; 325 TABLET ORAL at 07:19

## 2019-03-27 RX ADMIN — DEXTROSE MONOHYDRATE 2 G: 50 INJECTION, SOLUTION INTRAVENOUS at 05:45

## 2019-03-27 RX ADMIN — MULTIPLE VITAMINS W/ MINERALS TAB 1 TABLET: TAB ORAL at 09:13

## 2019-03-27 RX ADMIN — HYDROCODONE BITARTRATE AND ACETAMINOPHEN 1 TABLET: 5; 325 TABLET ORAL at 02:56

## 2019-03-27 RX ADMIN — PANTOPRAZOLE SODIUM 40 MG: 40 TABLET, DELAYED RELEASE ORAL at 09:13

## 2019-03-27 RX ADMIN — ACETAMINOPHEN 325 MG: 325 TABLET, FILM COATED ORAL at 02:55

## 2019-03-27 NOTE — PROGRESS NOTES
[facility]  Discharge Progress Note     LOS: 0 days   Patient Care Team:  Joe Madera MD as PCP - General (Family Medicine)    Chief Complaint:  Persistent atrial fibrillation, tachy-domo syndrome    Subjective     Interval History: Patient is s/p AVN ablation + PPM implant yesterday and has done well overnight.  Mild incisional pain improved with pain meds.  No c/o CP, SOB, or palpitations.  Feels ready to go home.         Review of Systems:   Pertinent positives in HPI, all others reviewed and negative.      Current Facility-Administered Medications:   •  acetaminophen (TYLENOL) tablet 650 mg, 650 mg, Oral, Q4H PRN, 325 mg at 03/27/19 0255 **OR** acetaminophen (TYLENOL) 160 MG/5ML solution 650 mg, 650 mg, Oral, Q4H PRN **OR** acetaminophen (TYLENOL) suppository 650 mg, 650 mg, Rectal, Q4H PRN, Parth Almazan MD  •  ALPRAZolam (XANAX) tablet 0.25 mg, 0.25 mg, Oral, BID PRN, Parth Almazan MD  •  atorvastatin (LIPITOR) tablet 20 mg, 20 mg, Oral, Nightly, Netta Winston APRN, 20 mg at 03/26/19 2153  •  fluticasone (FLONASE) 50 MCG/ACT nasal spray 2 spray, 2 spray, Nasal, Daily PRN, Netta Winston APRMIKAYLA  •  HYDROcodone-acetaminophen (NORCO) 5-325 MG per tablet 1 tablet, 1 tablet, Oral, Q4H PRN, Parth Almazan MD, 1 tablet at 03/27/19 0719  •  levothyroxine (SYNTHROID, LEVOTHROID) tablet 50 mcg, 50 mcg, Oral, Nightly, Netta Winston APRN, 50 mcg at 03/26/19 2152  •  multivitamin with minerals 1 tablet, 1 tablet, Oral, Daily, Netta Winston APRN  •  ondansetron (ZOFRAN) injection 4 mg, 4 mg, Intravenous, Q6H PRN, Parth Almazan MD  •  ondansetron (ZOFRAN) injection 4 mg, 4 mg, Intravenous, Q6H PRN, Parth Almazan MD  •  pantoprazole (PROTONIX) EC tablet 40 mg, 40 mg, Oral, Catrachito MALLORY Kristen Nicole, APRN  •  sodium chloride 0.9 % flush 3 mL, 3 mL, Intravenous, Q12H, Parth Almazan MD, 3 mL at  "03/26/19 2154  •  sodium chloride 0.9 % flush 3-10 mL, 3-10 mL, Intravenous, PRN, Parth Almazan MD      Objective     Vital Sign Min/Max for last 24 hours  Temp  Min: 98.4 °F (36.9 °C)  Max: 98.6 °F (37 °C)   BP  Min: 101/54  Max: 199/77   Pulse  Min: 37  Max: 138   Resp  Min: 16  Max: 26   SpO2  Min: 92 %  Max: 100 %   No Data Recorded   Weight  Min: 70.3 kg (154 lb 15.7 oz)  Max: 70.3 kg (154 lb 15.7 oz)     Flowsheet Rows      First Filed Value   Admission Height  162.6 cm (64\") Documented at 03/26/2019 1048   Admission Weight  70.3 kg (154 lb 15.7 oz) Documented at 03/26/2019 1048          Physical Exam:     General Appearance:    Alert, cooperative, in no acute distress   Lungs:     Clear to auscultation, respirations regular, even and                  unlabored    Heart:    Regular rhythm and normal rate, normal S1 and S2, 3/6 systolic ejection murmur, no gallop, no rub, no click   Chest Wall:    No abnormalities observed   Abdomen:     Normal bowel sounds, no masses, soft, non-tender, non-distended    Extremities:   No gross deformities, no edema, no cyanosis,    Pulses:   Pulses palpable and equal bilaterally   Skin:   Implant site clean dry intact without signs of bleeding, hematoma or infection.         Results Review:   Results from last 7 days   Lab Units 03/25/19  1231   WBC 10*3/mm3 6.39   HEMOGLOBIN g/dL 11.9   HEMATOCRIT % 37.1   PLATELETS 10*3/mm3 242     Results from last 7 days   Lab Units 03/27/19  0303 03/25/19  1231   SODIUM mmol/L 139 140   POTASSIUM mmol/L 4.3 4.1   CHLORIDE mmol/L 107 106   CO2 mmol/L 26.0 26.0   BUN mg/dL 24* 31*   CREATININE mg/dL 1.59* 2.00*   GLUCOSE mg/dL 84 122*      Results from last 7 days   Lab Units 03/25/19  1231   HEMOGLOBIN A1C % 5.60                 Results from last 7 days   Lab Units 03/25/19  1231   PROTIME Seconds 15.5*   INR  1.29*           Intake/Output Summary (Last 24 hours) at 3/27/2019 0747  Last data filed at 3/27/2019 0545  Gross per 24 hour "   Intake 740 ml   Output --   Net 740 ml       I personally viewed and interpreted the patient's EKG/Telemetry data    Chest X-ray: no penumothorax; acceptable RA/RV lead position.     Telemetry: A/V paced, HR 37-80 bpm      Present on Admission:  **None**    Assessment/Plan    1. Persistent atrial fibrillation/tachy-domo syndrome:  - Recurrent, symptomatic persistent atrial fibrillation with RVR.  She had side effects and thyroid toxicity to amiodarone and has failed medical therapy with Multaq.   - S/p AVN ablation + [employer] Chidi DDD PPM implant yesterday.  Pt has done well overnight. The chest Xray and chest incision site are unremarkable. Wound care and post device care have been reviewed with the patient in detail  - CHADSVASc = 4, on Eliquis.  Lowered to 2.5 mg bid secondary to age and kidney dysfunction.     2. HTN:  - HCTZ/lisinopril discontinued secondary to elevated Cr  - BP's stable here.  She will continue to monitor her BP's at home.  If BP's >140/90, she will resume her Cardura 4 mg at nighttime.  - She is concerned about LE swelling off of HCTZ.  She does have furosemide at home.  Instructed her that she can take furosemide 20 mg daily PRN for LE swelling.      3. CYNTHIA:  - Cr 2.0 upon admission (unclear baseline, no previous labs for review), improved to 1.59 after IVF and holding BP meds as above  - Repeat BMP in 1 week when she comes in for wound check      Plan for disposition: The patient is stable and will be discharged to home today with plan for wound check and BMP in 7-10 days and follow up with Dr. Almazan in 3 months with [facility] Ch[facility] device check.      Electronically signed by CANDY Clark, 03/27/19, 7:47 AM.

## 2019-03-27 NOTE — PLAN OF CARE
Problem: Patient Care Overview  Goal: Plan of Care Review  Outcome: Ongoing (interventions implemented as appropriate)   03/27/19 0108   Coping/Psychosocial   Plan of Care Reviewed With patient   Plan of Care Review   Progress improving   OTHER   Outcome Summary Stable night post PPM implant/ AVN ablation. AV pacing 100%. Left device site w pressure dressing, no swelling or draining. Left arm in sling. Taking prn meds for site pain. Rt fem site soft w/o hematoma or oozing. amb independently after asst OOB        Problem: Cardiac Rhythm Management Device (Adult)  Goal: Signs and Symptoms of Listed Potential Problems Will be Absent, Minimized or Managed (Cardiac Rhythm Management Device)  Outcome: Ongoing (interventions implemented as appropriate)

## 2019-04-04 ENCOUNTER — CLINICAL SUPPORT NO REQUIREMENTS (OUTPATIENT)
Dept: CARDIOLOGY | Facility: CLINIC | Age: 79
End: 2019-04-04

## 2019-04-04 ENCOUNTER — LAB REQUISITION (OUTPATIENT)
Dept: LAB | Facility: HOSPITAL | Age: 79
End: 2019-04-04

## 2019-04-04 ENCOUNTER — OFFICE VISIT (OUTPATIENT)
Dept: CARDIOLOGY | Facility: CLINIC | Age: 79
End: 2019-04-04

## 2019-04-04 DIAGNOSIS — I48.91 ATRIAL FIBRILLATION, UNSPECIFIED TYPE (HCC): Primary | ICD-10-CM

## 2019-04-04 DIAGNOSIS — I48.19 PERSISTENT ATRIAL FIBRILLATION (HCC): Primary | ICD-10-CM

## 2019-04-04 DIAGNOSIS — Z00.00 ROUTINE GENERAL MEDICAL EXAMINATION AT A HEALTH CARE FACILITY: ICD-10-CM

## 2019-04-04 LAB
ANION GAP SERPL CALCULATED.3IONS-SCNC: 9 MMOL/L (ref 3–11)
BUN BLD-MCNC: 24 MG/DL (ref 9–23)
BUN/CREAT SERPL: 17.1 (ref 7–25)
CALCIUM SPEC-SCNC: 9.6 MG/DL (ref 8.7–10.4)
CHLORIDE SERPL-SCNC: 107 MMOL/L (ref 99–109)
CO2 SERPL-SCNC: 26 MMOL/L (ref 20–31)
CREAT BLD-MCNC: 1.4 MG/DL (ref 0.6–1.3)
GFR SERPL CREATININE-BSD FRML MDRD: 36 ML/MIN/1.73
GLUCOSE BLD-MCNC: 161 MG/DL (ref 70–100)
POTASSIUM BLD-SCNC: 4.2 MMOL/L (ref 3.5–5.5)
SODIUM BLD-SCNC: 142 MMOL/L (ref 132–146)

## 2019-04-04 PROCEDURE — 99024 POSTOP FOLLOW-UP VISIT: CPT | Performed by: INTERNAL MEDICINE

## 2019-04-04 PROCEDURE — 80048 BASIC METABOLIC PNL TOTAL CA: CPT | Performed by: NURSE PRACTITIONER

## 2019-04-04 RX ORDER — DOXAZOSIN MESYLATE 4 MG/1
4 TABLET ORAL NIGHTLY
Qty: 30 TABLET | Refills: 5 | Status: SHIPPED | OUTPATIENT
Start: 2019-04-04 | End: 2019-06-27 | Stop reason: SDUPTHER

## 2019-04-04 RX ORDER — FUROSEMIDE 20 MG/1
20 TABLET ORAL DAILY PRN
Qty: 30 TABLET | Refills: 5 | Status: SHIPPED | OUTPATIENT
Start: 2019-04-04 | End: 2019-08-23 | Stop reason: SDUPTHER

## 2019-04-04 NOTE — PROGRESS NOTES
2019    Dacula Dillonelver Schreiber, : 1940    WOUND CHECK    B/P:     Pulse:     Patient has fever: [] Temperature if indicated:     Wound Location: Left Infraclavicular    Dressing Removed [x]        Old Dressing Appearance:  Clean, dry [x]                 Old, bloody drainage []                            Moist, serous drainage []                Moist, thick yellow/green drainage []       Wound Appearance: Redness []                  Drainage []                  Culture obtained []        Color: N/A     Consistency: n/a     Amount: none         Gloves used, wound cleansed with sterile 4x4 and peroxide [x]       MD notified [] MD orders:     Antibiotic started []  If checked, type   Other:     Appointment for follow-up scheduled for 3 months post procedure [x]    Future Appointments   Date Time Provider Department Center   2019  1:00 PM EP [facility] None           Cipriano Marks MA, 19      MD Signature:______________________________ Completed By/Date:

## 2019-04-08 LAB
BUN SERPL-MCNC: 24 MG/DL (ref 9–23)
BUN/CREAT SERPL: 17.1 (ref 7–25)
CHLORIDE SERPL-SCNC: 107 MMOL/L (ref 99–109)
CO2 SERPL-SCNC: 26 MMOL/L (ref 20–31)
CREAT SERPL-MCNC: 1.4 MG/DL (ref 0.6–1.3)
GLUCOSE SERPL-MCNC: 161 MG/DL (ref 70–100)
POTASSIUM SERPL-SCNC: 4.2 MMOL/L (ref 3.5–5.5)
SODIUM SERPL-SCNC: 142 MMOL/L (ref 132–146)

## 2019-05-10 ENCOUNTER — TELEPHONE (OUTPATIENT)
Dept: CARDIOLOGY | Facility: CLINIC | Age: 79
End: 2019-05-10

## 2019-05-10 NOTE — TELEPHONE ENCOUNTER
Pt called over all not feeling well since ppm/avn,  I reviewed remote transmission and see nothing alarming but will bring her in to see if we can maybe make some changes to her sensor.  She is also having a lot of ectopy, PACs, PVC, and her sensing test shows AS/VS in what could be [place].  Will thoroughly check all on Wed.

## 2019-05-15 ENCOUNTER — CLINICAL SUPPORT (OUTPATIENT)
Dept: CARDIOLOGY | Facility: CLINIC | Age: 79
End: 2019-05-15

## 2019-05-15 VITALS — SYSTOLIC BLOOD PRESSURE: 160 MMHG | DIASTOLIC BLOOD PRESSURE: 100 MMHG

## 2019-05-15 DIAGNOSIS — I48.19 PERSISTENT ATRIAL FIBRILLATION (HCC): Primary | ICD-10-CM

## 2019-05-15 RX ORDER — FLECAINIDE ACETATE 150 MG/1
75 TABLET ORAL 2 TIMES DAILY
Qty: 30 TABLET | Refills: 2 | Status: SHIPPED | OUTPATIENT
Start: 2019-05-15 | End: 2019-07-03 | Stop reason: ALTCHOICE

## 2019-05-16 NOTE — PROGRESS NOTES
Pt came in today so we could make some alterations to her device after reviewing her remote transmission from 5/10 as she has not felt well since her AVN.  She states she had a few days where she felt pretty good but most days she has no energy and get SOA.  She is having a lot of PACs/PVCs so we changed to PVARP to 350ms to help avoid some of the timing issues.  Dr. Almazan also reviewed her interrogation.  She has been in AF for past 3 days and has total of 26% AMS, he would like for her to start on Flec 75mg BID.  She will do this on Saturday and get EKG on Monday.

## 2019-06-27 ENCOUNTER — OFFICE VISIT (OUTPATIENT)
Dept: CARDIOLOGY | Facility: CLINIC | Age: 79
End: 2019-06-27

## 2019-06-27 VITALS
BODY MASS INDEX: 26.5 KG/M2 | HEART RATE: 81 BPM | DIASTOLIC BLOOD PRESSURE: 76 MMHG | WEIGHT: 155.2 LBS | SYSTOLIC BLOOD PRESSURE: 142 MMHG | HEIGHT: 64 IN

## 2019-06-27 DIAGNOSIS — I48.19 PERSISTENT ATRIAL FIBRILLATION (HCC): ICD-10-CM

## 2019-06-27 DIAGNOSIS — I49.5 TACHY-BRADY SYNDROME (HCC): ICD-10-CM

## 2019-06-27 DIAGNOSIS — R06.09 DOE (DYSPNEA ON EXERTION): Primary | ICD-10-CM

## 2019-06-27 DIAGNOSIS — I10 ESSENTIAL HYPERTENSION: ICD-10-CM

## 2019-06-27 DIAGNOSIS — R60.0 LOWER EXTREMITY EDEMA: ICD-10-CM

## 2019-06-27 PROCEDURE — 99213 OFFICE O/P EST LOW 20 MIN: CPT | Performed by: PHYSICIAN ASSISTANT

## 2019-06-27 PROCEDURE — 93000 ELECTROCARDIOGRAM COMPLETE: CPT | Performed by: PHYSICIAN ASSISTANT

## 2019-06-27 RX ORDER — DOXAZOSIN MESYLATE 4 MG/1
4 TABLET ORAL 2 TIMES DAILY
Qty: 30 TABLET | Refills: 5 | Status: SHIPPED | OUTPATIENT
Start: 2019-06-27 | End: 2019-07-03 | Stop reason: SDUPTHER

## 2019-06-27 NOTE — PROGRESS NOTES
Tania Schreiber  1940  [phone]    06/27/2019    [facility] CARDIOLOGY     Price, Joe Renteria MD  [address] [place] [id number]    Chief Complaint   Patient presents with   • Atrial Fibrillation   • Shortness of Breath   • Leg Swelling     Problem List:  1. Persistent atrial fibrillation  a. CHADSVASc = 4 (age >75, female, HTN) on Eliquis  b. S/p ISABELA/ECV, 10/2016  c. ISABELA 10/27/16: EF 45-50%, mild to moderate MR, moderate TR  d. Previously treated with amiodarone, discontinued secondary to thyroid toxicity  e. Echocardiogram 4/12/17: EF 60-65%, no significant valvular disease  f. Echocardiogram 3/6/19: EF 56-60%, mild AS.   g. St Chidi DDD pacemaker implant plus AV node ablation 3/26/2019  h. Initiated Flecainide 5/2019  2. Abnormal MPS:  a. MPS 10/24/16: EF 63%, medium, completely reversible, mid to basal-anteroseptal defect  b. Left heart catheterization 10/25/16: No significant coronary artery disease  3. Hypertension  4. Hyperlipidemia  5. Hypothyroidism  6. GERD  7. Surgical history:  a. Left knee surgery  b. Bilateral cataract surgery  c. Jaw tumor removal  d. Tonsillectomy  e. Cholecystectomy        Allergies  No Known Allergies    Current Medications    Current Outpatient Medications:   •  ALPRAZolam (XANAX) 0.5 MG tablet, Take 0.25-0.5 mg by mouth 2 (Two) Times a Day As Needed for Anxiety., Disp: , Rfl:   •  apixaban (ELIQUIS) 2.5 MG tablet tablet, Take 1 tablet by mouth Every 12 (Twelve) Hours., Disp: 60 tablet, Rfl: 5  •  atorvastatin (LIPITOR) 20 MG tablet, Take 20 mg by mouth Every Night., Disp: , Rfl:   •  doxazosin (CARDURA) 4 MG tablet, Take 1 tablet by mouth 2 (Two) Times a Day., Disp: 30 tablet, Rfl: 5  •  flecainide (TAMBOCOR) 150 MG tablet, Take 0.5 tablets by mouth 2 (Two) Times a Day., Disp: 30 tablet, Rfl: 2  •  fluticasone (FLONASE) 50 MCG/ACT nasal spray, 2 sprays into the nostril(s) as directed by provider Daily As Needed for Rhinitis or Allergies.,  "Disp: , Rfl:   •  furosemide (LASIX) 20 MG tablet, Take 1 tablet by mouth Daily As Needed (LE swelling)., Disp: 30 tablet, Rfl: 5  •  lansoprazole (PREVACID) 30 MG capsule, Take 30 mg by mouth Every Morning., Disp: , Rfl:   •  levothyroxine (SYNTHROID, LEVOTHROID) 50 MCG tablet, Take 50 mcg by mouth Every Night., Disp: , Rfl:   •  Multiple Vitamins-Minerals (PRESERVISION AREDS PO), Take 2 tablets by mouth Daily., Disp: , Rfl:   •  Polyethylene Glycol 3350 (MIRALAX PO), Take 1 dose by mouth Daily As Needed (constipation)., Disp: , Rfl:     History of Present Illness:    Pt presents for follow up of persistent atrial fibrillation, tachybradycardia syndrome, AV block status post [employer]idi pacemaker implant and AV node ablation in March 2019 and HTN. Since her procedure, in May, after her procedure she was not feeling well and was found on device interrogation to have persistent atrial fibrillation and PACs and PVCs.  He was started on flecainide 75 mg twice daily at that time.  Since then she has been feeling tired and SOB with activity and also with pressure in her chest, relieved quickly after she sits down. She also feels shaky at times. No syncope. Her BPs have for the most part been stable, however in the morning her BPs are higher.  She has been going to physical therapy trying to help her legs get stronger.     ROS:  General: + fatigue, - weight gain or loss  Cardiovascular:  Denies CP, PND, syncope, near syncope, edema or palpitations.  Pulmonary:  + NARAYANAN, - cough, or wheezing      Vitals:    06/27/19 1306   BP: 142/76   BP Location: Right arm   Patient Position: Sitting   Pulse: 81   Weight: 70.4 kg (155 lb 3.2 oz)   Height: 162.6 cm (64\")     Body mass index is 26.64 kg/m².  PE:  General: NAD. A & O x 3  Neck: no JVD, no carotid bruits, no TM  Heart RRR, NL S1, S2, S4 present, no rubs, +AS and MR murmurs  Lungs: CTA, no wheezes, rhonchi, or rales  Abd: soft, non-tender, NL BS  Ext: No musculoskeletal " deformities, trace pretibial edema, -cyanosis, or clubbing  Psych: normal mood and affect  Skin: PM site well healed.     Diagnostic Data:    Pacemaker Manual Interrogation: normal function. 99% V paced. Changed lower rate to 70 bpm. Sensor slope increased by 10. 7.4 -8.7 years on battery.  12% afib burden, but none since mid May (after Flecainide was started)    ECG 12 Lead  Date/Time: 6/27/2019 1:22 PM  Performed by: Mary Alonso PA  Authorized by: Mary Alonso PA   Comparison: compared with previous ECG from 5/20/2019  Similar to previous ECG  Comparison to previous ECG: QRS unchanged from previous  Rhythm: sinus rhythm and paced  BPM: 81              1. NARAYANAN (dyspnea on exertion)    2. Persistent atrial fibrillation (CMS/HCC)    3. Tachy-domo syndrome (CMS/HCC)    4. Essential hypertension    5. Lower extremity edema          Plan:  1.  NARAYANAN:  - reviewed recent echo from March with normal EF and mild AS. She has had essentially normal LHC in 2016.   - We adjusted her PM to allow for better rate response which will hopefully help with some of her symptoms.  - She is also 99% V paced now, and may benefit from BiV PM upgrade if EF decreases. Will recheck echocardiogram.    - She does not appear to be fluid overloaded, but I did tell her to increase her lasix for three days to 40 mg daily, then go back to 20 mg daily. She will call us back in a couple of weeks to let us know how she is doing.     2. Persistent atrial fibrillation:  - doing well on Flecainide, no further episodes since starting on Flecainide. EKG shows stable QRS, V paced.   -Status post AV node ablation with normal pacemaker function. Changed lower rate from 80 to 70 bpm on device today.  -CHADSVasc = 4 continue Eliquis low-dose 2.5 mg twice daily    3.  AV block/tachybradycardia syndrome:  -Normal pacemaker function. Changed lower rate to 70 bpm. Also increased sensor slope to 10 hopefully to help with her fatigue and SOB.   - she will call  us back in two weeks with her symptoms.     4.  Hypertension:  - not well controlled. Increase Cardura to 4 mg BID. Call us in two weeks with BPs.     5. Lower extremity edema:  - take extra lasix for three days in a row.     F/up in 6 months

## 2019-07-02 ENCOUNTER — HOSPITAL ENCOUNTER (OUTPATIENT)
Dept: CARDIOLOGY | Facility: HOSPITAL | Age: 79
Discharge: HOME | End: 2019-07-02
Admitting: PHYSICIAN ASSISTANT

## 2019-07-02 VITALS — HEIGHT: 64 IN | BODY MASS INDEX: 26.46 KG/M2 | WEIGHT: 155 LBS

## 2019-07-02 DIAGNOSIS — R06.09 DOE (DYSPNEA ON EXERTION): ICD-10-CM

## 2019-07-02 LAB
BH CV ECHO MEAS - AO ROOT AREA (BSA CORRECTED): 1.7
BH CV ECHO MEAS - AO ROOT AREA: 6.5 CM^2
BH CV ECHO MEAS - AO ROOT DIAM: 2.9 CM
BH CV ECHO MEAS - BSA(HAYCOCK): 1.8 M^2
BH CV ECHO MEAS - BSA: 1.7 M^2
BH CV ECHO MEAS - BZI_BMI: 27.5 KILOGRAMS/M^2
BH CV ECHO MEAS - BZI_METRIC_HEIGHT: 160 CM
BH CV ECHO MEAS - BZI_METRIC_WEIGHT: 70.3 KG
BH CV ECHO MEAS - EDV(CUBED): 117.6 ML
BH CV ECHO MEAS - EDV(MOD-SP2): 74 ML
BH CV ECHO MEAS - EDV(MOD-SP4): 79 ML
BH CV ECHO MEAS - EDV(TEICH): 112.8 ML
BH CV ECHO MEAS - EF(CUBED): 63.1 %
BH CV ECHO MEAS - EF(MOD-BP): 47 %
BH CV ECHO MEAS - EF(MOD-SP2): 45.9 %
BH CV ECHO MEAS - EF(MOD-SP4): 49.4 %
BH CV ECHO MEAS - EF(TEICH): 54.4 %
BH CV ECHO MEAS - ESV(CUBED): 43.4 ML
BH CV ECHO MEAS - ESV(MOD-SP2): 40 ML
BH CV ECHO MEAS - ESV(MOD-SP4): 40 ML
BH CV ECHO MEAS - ESV(TEICH): 51.4 ML
BH CV ECHO MEAS - FS: 28.2 %
BH CV ECHO MEAS - IVS/LVPW: 1.1
BH CV ECHO MEAS - IVSD: 1.3 CM
BH CV ECHO MEAS - LA DIMENSION: 4 CM
BH CV ECHO MEAS - LA/AO: 1.4
BH CV ECHO MEAS - LAD MAJOR: 5.6 CM
BH CV ECHO MEAS - LAT PEAK E' VEL: 6.4 CM/SEC
BH CV ECHO MEAS - LATERAL E/E' RATIO: 13.4
BH CV ECHO MEAS - LV DIASTOLIC VOL/BSA (35-75): 45.5 ML/M^2
BH CV ECHO MEAS - LV MASS(C)D: 238.4 GRAMS
BH CV ECHO MEAS - LV MASS(C)DI: 137.4 GRAMS/M^2
BH CV ECHO MEAS - LV SYSTOLIC VOL/BSA (12-30): 23.1 ML/M^2
BH CV ECHO MEAS - LVIDD: 4.9 CM
BH CV ECHO MEAS - LVIDS: 3.5 CM
BH CV ECHO MEAS - LVLD AP2: 7.4 CM
BH CV ECHO MEAS - LVLD AP4: 7.9 CM
BH CV ECHO MEAS - LVLS AP2: 7 CM
BH CV ECHO MEAS - LVLS AP4: 6.4 CM
BH CV ECHO MEAS - LVOT AREA (M): 3.1 CM^2
BH CV ECHO MEAS - LVOT AREA: 3.2 CM^2
BH CV ECHO MEAS - LVOT DIAM: 2 CM
BH CV ECHO MEAS - LVPWD: 1.2 CM
BH CV ECHO MEAS - MED PEAK E' VEL: 4.1 CM/SEC
BH CV ECHO MEAS - MEDIAL E/E' RATIO: 20.8
BH CV ECHO MEAS - MV A MAX VEL: 98.2 CM/SEC
BH CV ECHO MEAS - MV DEC SLOPE: 700.9 CM/SEC^2
BH CV ECHO MEAS - MV DEC TIME: 0.25 SEC
BH CV ECHO MEAS - MV E MAX VEL: 87.4 CM/SEC
BH CV ECHO MEAS - MV E/A: 0.89
BH CV ECHO MEAS - MV P1/2T MAX VEL: 130.3 CM/SEC
BH CV ECHO MEAS - MV P1/2T: 54.5 MSEC
BH CV ECHO MEAS - MVA P1/2T LCG: 1.7 CM^2
BH CV ECHO MEAS - MVA(P1/2T): 4 CM^2
BH CV ECHO MEAS - PA ACC SLOPE: 802.6 CM/SEC^2
BH CV ECHO MEAS - PA ACC TIME: 0.09 SEC
BH CV ECHO MEAS - PA PR(ACCEL): 38.6 MMHG
BH CV ECHO MEAS - RAP SYSTOLE: 10 MMHG
BH CV ECHO MEAS - RVDD: 2.5 CM
BH CV ECHO MEAS - RVSP: 41 MMHG
BH CV ECHO MEAS - SI(CUBED): 42.7 ML/M^2
BH CV ECHO MEAS - SI(MOD-SP2): 19.6 ML/M^2
BH CV ECHO MEAS - SI(MOD-SP4): 22.5 ML/M^2
BH CV ECHO MEAS - SI(TEICH): 35.4 ML/M^2
BH CV ECHO MEAS - SV(CUBED): 74.1 ML
BH CV ECHO MEAS - SV(MOD-SP2): 34 ML
BH CV ECHO MEAS - SV(MOD-SP4): 39 ML
BH CV ECHO MEAS - SV(TEICH): 61.4 ML
BH CV ECHO MEAS - TAPSE (>1.6): 1.6 CM2
BH CV ECHO MEAS - TR MAX PG: 31 MMHG
BH CV ECHO MEAS - TR MAX VEL: 278.3 CM/SEC
BH CV ECHO MEASUREMENTS AVERAGE E/E' RATIO: 16.65
BH CV XLRA - RV BASE: 3 CM
BH CV XLRA - RV LENGTH: 6.6 CM
BH CV XLRA - RV MID: 2.2 CM
LEFT ATRIUM VOLUME INDEX: 39.2 ML/M^2
LEFT ATRIUM VOLUME: 68 ML
MAXIMAL PREDICTED HEART RATE: 140 BPM
STRESS TARGET HR: 119 BPM

## 2019-07-02 PROCEDURE — 93306 TTE W/DOPPLER COMPLETE: CPT | Performed by: INTERNAL MEDICINE

## 2019-07-02 PROCEDURE — 25010000002 SULFUR HEXAFLUORIDE MICROSPH 60.7-25 MG RECONSTITUTED SUSPENSION: Performed by: PHYSICIAN ASSISTANT

## 2019-07-02 PROCEDURE — 93306 TTE W/DOPPLER COMPLETE: CPT

## 2019-07-02 RX ADMIN — SULFUR HEXAFLUORIDE 2 ML: KIT at 16:55

## 2019-07-03 ENCOUNTER — TELEPHONE (OUTPATIENT)
Dept: CARDIOLOGY | Facility: CLINIC | Age: 79
End: 2019-07-03

## 2019-07-03 DIAGNOSIS — I48.19 PERSISTENT ATRIAL FIBRILLATION (HCC): Primary | ICD-10-CM

## 2019-07-03 RX ORDER — DOXAZOSIN MESYLATE 4 MG/1
4 TABLET ORAL NIGHTLY
Qty: 30 TABLET | Refills: 6 | Status: SHIPPED | OUTPATIENT
Start: 2019-07-03 | End: 2020-07-09

## 2019-07-03 RX ORDER — CARVEDILOL 6.25 MG/1
6.25 TABLET ORAL 2 TIMES DAILY
Qty: 60 TABLET | Refills: 6 | Status: SHIPPED | OUTPATIENT
Start: 2019-07-03 | End: 2019-07-30 | Stop reason: SDUPTHER

## 2019-07-03 NOTE — TELEPHONE ENCOUNTER
Discussed results of echocardiogram with Dr. Almazan, EF is now 41-45% and MR is severe. He recommends stopping Flecainide at this time due to LV dysfunction. Initiation of Coreg 6.25 mg BID, decrease Cardura back to 4 mg daily. Increase Lasix to 40 mg daily. BMP in one week. If Cr is stable on BMP, would initiate Entresto.    I talked with patient today and explained the treatment plan in detail. She understands and will pick her prescription up today. BMP order faxed to Dr. Joe Madera's office.     Electronically signed by BECCA Youngblood, 07/03/19, 12:03 PM.

## 2019-07-10 ENCOUNTER — RESULTS ENCOUNTER (OUTPATIENT)
Dept: CARDIOLOGY | Facility: CLINIC | Age: 79
End: 2019-07-10

## 2019-07-10 DIAGNOSIS — I48.19 PERSISTENT ATRIAL FIBRILLATION (HCC): ICD-10-CM

## 2019-07-11 DIAGNOSIS — I42.0 CARDIOMYOPATHY, DILATED, NONISCHEMIC (HCC): Primary | ICD-10-CM

## 2019-07-11 NOTE — PROGRESS NOTES
Reviewed patient's BMP after increase of Lasix to 40 mg daily.  Creatinine is stable and potassium within normal limits.  I discussed results with patient today and went over her symptoms.  She does have improvement in her symptoms with initiation of Coreg and Lasix 40 mg daily.  Dr. Almazan's recommendations last week, will initiate Entresto with BMP 5 days later.  BMP order will be faxed to Dr. Joe Madera's office.  Patient would like to wait to start Entresto until after she gets back in town from a trip on 7/18/2019.

## 2019-07-15 ENCOUNTER — TELEPHONE (OUTPATIENT)
Dept: CARDIOLOGY | Facility: CLINIC | Age: 79
End: 2019-07-15

## 2019-07-23 ENCOUNTER — RESULTS ENCOUNTER (OUTPATIENT)
Dept: CARDIOLOGY | Facility: CLINIC | Age: 79
End: 2019-07-23

## 2019-07-23 DIAGNOSIS — I42.0 CARDIOMYOPATHY, DILATED, NONISCHEMIC (HCC): ICD-10-CM

## 2019-07-25 ENCOUNTER — CLINICAL SUPPORT NO REQUIREMENTS (OUTPATIENT)
Dept: CARDIOLOGY | Facility: CLINIC | Age: 79
End: 2019-07-25

## 2019-07-25 DIAGNOSIS — I49.5 TACHY-BRADY SYNDROME (HCC): ICD-10-CM

## 2019-07-25 PROCEDURE — 93296 REM INTERROG EVL PM/IDS: CPT | Performed by: INTERNAL MEDICINE

## 2019-07-25 PROCEDURE — 93294 REM INTERROG EVL PM/LDLS PM: CPT | Performed by: INTERNAL MEDICINE

## 2019-07-29 NOTE — TELEPHONE ENCOUNTER
Ke has denied Entresto. EF has to be < or = 40%. I also received her BMP. Creatinine is 1.48 and was previously 1.37.

## 2019-07-30 RX ORDER — CARVEDILOL 12.5 MG/1
12.5 TABLET ORAL 2 TIMES DAILY
Qty: 60 TABLET | Refills: 6 | Status: SHIPPED | OUTPATIENT
Start: 2019-07-30 | End: 2020-02-03

## 2019-07-30 NOTE — TELEPHONE ENCOUNTER
Patient returned phone call and she understands instructions. She will continue to monitor her BP and HR. She states she is still very tired but will let us know if this continues as well.

## 2019-08-04 ENCOUNTER — CLINICAL SUPPORT NO REQUIREMENTS (OUTPATIENT)
Dept: CARDIOLOGY | Facility: CLINIC | Age: 79
End: 2019-08-04

## 2019-08-04 DIAGNOSIS — I49.5 TACHY-BRADY SYNDROME (HCC): ICD-10-CM

## 2019-08-04 DIAGNOSIS — I48.19 PERSISTENT ATRIAL FIBRILLATION (HCC): Primary | ICD-10-CM

## 2019-08-07 DIAGNOSIS — I49.5 TACHY-BRADY SYNDROME (HCC): Primary | ICD-10-CM

## 2019-08-13 ENCOUNTER — PREP FOR SURGERY (OUTPATIENT)
Dept: OTHER | Facility: HOSPITAL | Age: 79
End: 2019-08-13

## 2019-08-13 DIAGNOSIS — I42.0 CARDIOMYOPATHY, DILATED, NONISCHEMIC (HCC): ICD-10-CM

## 2019-08-13 DIAGNOSIS — I49.5 TACHY-BRADY SYNDROME (HCC): Primary | ICD-10-CM

## 2019-08-13 RX ORDER — PROMETHAZINE HYDROCHLORIDE 25 MG/ML
12.5 INJECTION, SOLUTION INTRAMUSCULAR; INTRAVENOUS EVERY 4 HOURS PRN
Status: CANCELLED | OUTPATIENT
Start: 2019-08-13

## 2019-08-13 RX ORDER — ACETAMINOPHEN 325 MG/1
650 TABLET ORAL EVERY 4 HOURS PRN
Status: CANCELLED | OUTPATIENT
Start: 2019-08-13

## 2019-08-13 RX ORDER — SODIUM CHLORIDE 0.9 % (FLUSH) 0.9 %
3 SYRINGE (ML) INJECTION EVERY 12 HOURS SCHEDULED
Status: CANCELLED | OUTPATIENT
Start: 2019-08-13

## 2019-08-13 RX ORDER — SODIUM CHLORIDE 0.9 % (FLUSH) 0.9 %
1-10 SYRINGE (ML) INJECTION AS NEEDED
Status: CANCELLED | OUTPATIENT
Start: 2019-08-13

## 2019-08-13 RX ORDER — VANCOMYCIN HYDROCHLORIDE 1 G/200ML
1000 INJECTION, SOLUTION INTRAVENOUS ONCE
Status: CANCELLED | OUTPATIENT
Start: 2019-08-13

## 2019-08-13 RX ORDER — SODIUM CHLORIDE 9 MG/ML
1 INJECTION, SOLUTION INTRAVENOUS CONTINUOUS
Status: CANCELLED | OUTPATIENT
Start: 2019-08-13 | End: 2019-08-13

## 2019-08-13 RX ORDER — NITROGLYCERIN 0.4 MG/1
0.4 TABLET SUBLINGUAL
Status: CANCELLED | OUTPATIENT
Start: 2019-08-13

## 2019-08-20 ENCOUNTER — TELEPHONE (OUTPATIENT)
Dept: CARDIOLOGY | Facility: CLINIC | Age: 79
End: 2019-08-20

## 2019-08-20 NOTE — TELEPHONE ENCOUNTER
Spoke with patient by phone for potential enrollment in MORE-CRT-MPP enrollment.  Discussed with patient study purpose, procedure, risk, benefits.  Patient agrees to receiving consent in the mail and then will follow up with additional info and phonecall

## 2019-08-21 ENCOUNTER — TELEPHONE (OUTPATIENT)
Dept: CARDIOLOGY | Facility: CLINIC | Age: 79
End: 2019-08-21

## 2019-08-21 NOTE — TELEPHONE ENCOUNTER
Patient called to ask standard post op instructions for after her upgrade next week. Patient also had questions about the way her device was programmed currently. I transferred her to the device clinic.

## 2019-08-23 RX ORDER — FUROSEMIDE 20 MG/1
20 TABLET ORAL DAILY PRN
Qty: 90 TABLET | Refills: 1 | Status: SHIPPED | OUTPATIENT
Start: 2019-08-23 | End: 2019-08-31 | Stop reason: HOSPADM

## 2019-08-26 ENCOUNTER — TELEPHONE (OUTPATIENT)
Dept: OTHER | Facility: OTHER | Age: 79
End: 2019-08-26

## 2019-08-26 NOTE — TELEPHONE ENCOUNTER
Received message on answering machine from patient.  Returned patients phone call.  Following up on conversation with patient regarding potential enrollment in the MORE CRT study.  Re-reviewed with patient study purpose, procedure, risks, benefit, follow-up, alternative and cost.  Questions answered.  Patient requested I call her grandson, Johnie, and explain the study to him as well as her scheduled procedure.  Grandson called at patient request.  WIll follow up with patient to determine her decision on study enrollment.

## 2019-08-28 DIAGNOSIS — I42.0 CARDIOMYOPATHY, DILATED, NONISCHEMIC (HCC): Primary | ICD-10-CM

## 2019-08-29 ENCOUNTER — HOSPITAL ENCOUNTER (OUTPATIENT)
Dept: CARDIOLOGY | Facility: HOSPITAL | Age: 79
Discharge: HOME | End: 2019-08-29
Admitting: INTERNAL MEDICINE

## 2019-08-29 ENCOUNTER — APPOINTMENT (OUTPATIENT)
Dept: PREADMISSION TESTING | Facility: HOSPITAL | Age: 79
End: 2019-08-29

## 2019-08-29 DIAGNOSIS — I42.0 CARDIOMYOPATHY, DILATED, NONISCHEMIC (HCC): ICD-10-CM

## 2019-08-29 DIAGNOSIS — I49.5 TACHY-BRADY SYNDROME (HCC): ICD-10-CM

## 2019-08-29 LAB
ANION GAP SERPL CALCULATED.3IONS-SCNC: 12 MMOL/L (ref 5–15)
BUN BLD-MCNC: 32 MG/DL (ref 8–23)
BUN/CREAT SERPL: 23.4 (ref 7–25)
CALCIUM SPEC-SCNC: 9.4 MG/DL (ref 8.6–10.5)
CHLORIDE SERPL-SCNC: 101 MMOL/L (ref 98–107)
CO2 SERPL-SCNC: 28 MMOL/L (ref 22–29)
CREAT BLD-MCNC: 1.37 MG/DL (ref 0.57–1)
DEPRECATED RDW RBC AUTO: 47.8 FL (ref 37–54)
ERYTHROCYTE [DISTWIDTH] IN BLOOD BY AUTOMATED COUNT: 14.3 % (ref 12.3–15.4)
GFR SERPL CREATININE-BSD FRML MDRD: 37 ML/MIN/1.73
GLUCOSE BLD-MCNC: 103 MG/DL (ref 65–99)
HCT VFR BLD AUTO: 39.3 % (ref 34–46.6)
HGB BLD-MCNC: 12.5 G/DL (ref 12–15.9)
INR PPP: 1.1 (ref 0.85–1.16)
MCH RBC QN AUTO: 28.9 PG (ref 26.6–33)
MCHC RBC AUTO-ENTMCNC: 31.8 G/DL (ref 31.5–35.7)
MCV RBC AUTO: 91 FL (ref 79–97)
PLATELET # BLD AUTO: 205 10*3/MM3 (ref 140–450)
PMV BLD AUTO: 9.9 FL (ref 6–12)
POTASSIUM BLD-SCNC: 4.8 MMOL/L (ref 3.5–5.2)
PROTHROMBIN TIME: 13.7 SECONDS (ref 11.2–14.3)
RBC # BLD AUTO: 4.32 10*6/MM3 (ref 3.77–5.28)
SODIUM BLD-SCNC: 141 MMOL/L (ref 136–145)
WBC NRBC COR # BLD: 6.17 10*3/MM3 (ref 3.4–10.8)

## 2019-08-29 PROCEDURE — 93306 TTE W/DOPPLER COMPLETE: CPT | Performed by: INTERNAL MEDICINE

## 2019-08-29 PROCEDURE — 85027 COMPLETE CBC AUTOMATED: CPT | Performed by: NURSE PRACTITIONER

## 2019-08-29 PROCEDURE — 80048 BASIC METABOLIC PNL TOTAL CA: CPT | Performed by: NURSE PRACTITIONER

## 2019-08-29 PROCEDURE — 93306 TTE W/DOPPLER COMPLETE: CPT

## 2019-08-29 PROCEDURE — 85610 PROTHROMBIN TIME: CPT | Performed by: NURSE PRACTITIONER

## 2019-08-29 PROCEDURE — 36415 COLL VENOUS BLD VENIPUNCTURE: CPT

## 2019-08-30 ENCOUNTER — HOSPITAL ENCOUNTER (OUTPATIENT)
Facility: HOSPITAL | Age: 79
Discharge: HOME | End: 2019-08-31
Attending: INTERNAL MEDICINE | Admitting: INTERNAL MEDICINE

## 2019-08-30 VITALS — HEIGHT: 63 IN | WEIGHT: 155 LBS | BODY MASS INDEX: 27.46 KG/M2

## 2019-08-30 DIAGNOSIS — I49.5 TACHY-BRADY SYNDROME (HCC): ICD-10-CM

## 2019-08-30 DIAGNOSIS — I42.0 CARDIOMYOPATHY, DILATED, NONISCHEMIC (HCC): ICD-10-CM

## 2019-08-30 LAB
BH CV ECHO MEAS - EDV(MOD-SP2): 53 ML
BH CV ECHO MEAS - EDV(MOD-SP4): 67 ML
BH CV ECHO MEAS - EF(MOD-BP): 32 %
BH CV ECHO MEAS - ESV(MOD-SP2): 28 ML
BH CV ECHO MEAS - ESV(MOD-SP4): 45 ML
BH CV ECHO MEAS - LA DIMENSION: 3.3 CM
BH CV ECHO MEAS - MV MAX PG: 7 MMHG
BH CV ECHO MEAS - MV MEAN PG: 3 MMHG
BH CV ECHO MEAS - MV V2 VTI: 23 CM
BH CV ECHO MEAS - RAP SYSTOLE: 3 MMHG
BH CV ECHO MEAS - RVSP: 24 MMHG
BH CV ECHO MEAS - TR MAX PG: 21
BH CV ECHO MEAS - TR MAX VEL: 230 CM/SEC
MAXIMAL PREDICTED HEART RATE: 140 BPM
PISA ALIASING VEL: 4.2 M/S
PISA RADIUS: 0.6 CM
STRESS TARGET HR: 119 BPM

## 2019-08-30 PROCEDURE — 25010000002 ONDANSETRON PER 1 MG: Performed by: INTERNAL MEDICINE

## 2019-08-30 PROCEDURE — A9270 NON-COVERED ITEM OR SERVICE: HCPCS | Performed by: INTERNAL MEDICINE

## 2019-08-30 PROCEDURE — A9270 NON-COVERED ITEM OR SERVICE: HCPCS | Performed by: PHYSICIAN ASSISTANT

## 2019-08-30 PROCEDURE — 99152 MOD SED SAME PHYS/QHP 5/>YRS: CPT | Performed by: INTERNAL MEDICINE

## 2019-08-30 PROCEDURE — 63710000001 LEVOTHYROXINE 50 MCG TABLET: Performed by: PHYSICIAN ASSISTANT

## 2019-08-30 PROCEDURE — 63710000001 HYDROCODONE-ACETAMINOPHEN 5-325 MG TABLET: Performed by: INTERNAL MEDICINE

## 2019-08-30 PROCEDURE — 25010000003 LIDOCAINE 1 % SOLUTION: Performed by: INTERNAL MEDICINE

## 2019-08-30 PROCEDURE — 63710000001 ATORVASTATIN 20 MG TABLET: Performed by: PHYSICIAN ASSISTANT

## 2019-08-30 PROCEDURE — 63710000001 PANTOPRAZOLE 40 MG TABLET DELAYED-RELEASE: Performed by: PHYSICIAN ASSISTANT

## 2019-08-30 PROCEDURE — 25010000002 FENTANYL CITRATE (PF) 100 MCG/2ML SOLUTION: Performed by: INTERNAL MEDICINE

## 2019-08-30 PROCEDURE — C1730 CATH, EP, 19 OR FEW ELECT: HCPCS | Performed by: INTERNAL MEDICINE

## 2019-08-30 PROCEDURE — 33225 L VENTRIC PACING LEAD ADD-ON: CPT | Performed by: INTERNAL MEDICINE

## 2019-08-30 PROCEDURE — 0 IOPAMIDOL PER 1 ML: Performed by: INTERNAL MEDICINE

## 2019-08-30 PROCEDURE — 99153 MOD SED SAME PHYS/QHP EA: CPT | Performed by: INTERNAL MEDICINE

## 2019-08-30 PROCEDURE — 93010 ELECTROCARDIOGRAM REPORT: CPT | Performed by: INTERNAL MEDICINE

## 2019-08-30 PROCEDURE — 33229 REMV&REPLC PM GEN MULT LEADS: CPT | Performed by: INTERNAL MEDICINE

## 2019-08-30 PROCEDURE — C1900 LEAD, CORONARY VENOUS: HCPCS | Performed by: INTERNAL MEDICINE

## 2019-08-30 PROCEDURE — 63710000001 TERAZOSIN 5 MG CAPSULE: Performed by: PHYSICIAN ASSISTANT

## 2019-08-30 PROCEDURE — C2621 PMKR, OTHER THAN SING/DUAL: HCPCS | Performed by: INTERNAL MEDICINE

## 2019-08-30 PROCEDURE — 63710000001 CARVEDILOL 12.5 MG TABLET: Performed by: PHYSICIAN ASSISTANT

## 2019-08-30 PROCEDURE — 63710000001 FUROSEMIDE 40 MG TABLET: Performed by: PHYSICIAN ASSISTANT

## 2019-08-30 PROCEDURE — C1769 GUIDE WIRE: HCPCS | Performed by: INTERNAL MEDICINE

## 2019-08-30 PROCEDURE — 25010000002 VANCOMYCIN PER 500 MG: Performed by: NURSE PRACTITIONER

## 2019-08-30 PROCEDURE — 93005 ELECTROCARDIOGRAM TRACING: CPT | Performed by: INTERNAL MEDICINE

## 2019-08-30 PROCEDURE — C1894 INTRO/SHEATH, NON-LASER: HCPCS | Performed by: INTERNAL MEDICINE

## 2019-08-30 PROCEDURE — C1892 INTRO/SHEATH,FIXED,PEEL-AWAY: HCPCS | Performed by: INTERNAL MEDICINE

## 2019-08-30 PROCEDURE — 25010000002 MIDAZOLAM PER 1 MG: Performed by: INTERNAL MEDICINE

## 2019-08-30 DEVICE — IMPLANTABLE DEVICE: Type: IMPLANTABLE DEVICE | Status: FUNCTIONAL

## 2019-08-30 DEVICE — LD QUARTET CRT VENT LNG SPACING 86CM: Type: IMPLANTABLE DEVICE | Status: FUNCTIONAL

## 2019-08-30 RX ORDER — ONDANSETRON 2 MG/ML
4 INJECTION INTRAMUSCULAR; INTRAVENOUS EVERY 6 HOURS PRN
Status: DISCONTINUED | OUTPATIENT
Start: 2019-08-30 | End: 2019-08-31 | Stop reason: HOSPADM

## 2019-08-30 RX ORDER — CARVEDILOL 12.5 MG/1
12.5 TABLET ORAL 2 TIMES DAILY
Status: DISCONTINUED | OUTPATIENT
Start: 2019-08-30 | End: 2019-08-31 | Stop reason: HOSPADM

## 2019-08-30 RX ORDER — NITROGLYCERIN 0.4 MG/1
0.4 TABLET SUBLINGUAL
Status: DISCONTINUED | OUTPATIENT
Start: 2019-08-30 | End: 2019-08-30 | Stop reason: HOSPADM

## 2019-08-30 RX ORDER — FLUTICASONE PROPIONATE 50 MCG
2 SPRAY, SUSPENSION (ML) NASAL DAILY PRN
Status: DISCONTINUED | OUTPATIENT
Start: 2019-08-30 | End: 2019-08-31 | Stop reason: HOSPADM

## 2019-08-30 RX ORDER — LEVOTHYROXINE SODIUM 0.05 MG/1
50 TABLET ORAL NIGHTLY
Status: DISCONTINUED | OUTPATIENT
Start: 2019-08-30 | End: 2019-08-31 | Stop reason: HOSPADM

## 2019-08-30 RX ORDER — FENTANYL CITRATE 50 UG/ML
INJECTION, SOLUTION INTRAMUSCULAR; INTRAVENOUS AS NEEDED
Status: DISCONTINUED | OUTPATIENT
Start: 2019-08-30 | End: 2019-08-30 | Stop reason: HOSPADM

## 2019-08-30 RX ORDER — TERAZOSIN 5 MG/1
5 CAPSULE ORAL NIGHTLY
Status: DISCONTINUED | OUTPATIENT
Start: 2019-08-30 | End: 2019-08-31 | Stop reason: HOSPADM

## 2019-08-30 RX ORDER — FLUMAZENIL 0.1 MG/ML
INJECTION INTRAVENOUS AS NEEDED
Status: DISCONTINUED | OUTPATIENT
Start: 2019-08-30 | End: 2019-08-30 | Stop reason: HOSPADM

## 2019-08-30 RX ORDER — VANCOMYCIN HYDROCHLORIDE 1 G/200ML
1000 INJECTION, SOLUTION INTRAVENOUS ONCE
Status: COMPLETED | OUTPATIENT
Start: 2019-08-30 | End: 2019-08-30

## 2019-08-30 RX ORDER — SODIUM CHLORIDE 0.9 % (FLUSH) 0.9 %
10 SYRINGE (ML) INJECTION AS NEEDED
Status: DISCONTINUED | OUTPATIENT
Start: 2019-08-30 | End: 2019-08-31 | Stop reason: HOSPADM

## 2019-08-30 RX ORDER — SODIUM CHLORIDE 9 MG/ML
1 INJECTION, SOLUTION INTRAVENOUS CONTINUOUS
Status: ACTIVE | OUTPATIENT
Start: 2019-08-30 | End: 2019-08-30

## 2019-08-30 RX ORDER — PROMETHAZINE HYDROCHLORIDE 25 MG/ML
12.5 INJECTION, SOLUTION INTRAMUSCULAR; INTRAVENOUS EVERY 4 HOURS PRN
Status: DISCONTINUED | OUTPATIENT
Start: 2019-08-30 | End: 2019-08-30 | Stop reason: HOSPADM

## 2019-08-30 RX ORDER — ATORVASTATIN CALCIUM 20 MG/1
20 TABLET, FILM COATED ORAL NIGHTLY
Status: DISCONTINUED | OUTPATIENT
Start: 2019-08-30 | End: 2019-08-31 | Stop reason: HOSPADM

## 2019-08-30 RX ORDER — HYDROCODONE BITARTRATE AND ACETAMINOPHEN 5; 325 MG/1; MG/1
1 TABLET ORAL EVERY 4 HOURS PRN
Status: DISCONTINUED | OUTPATIENT
Start: 2019-08-30 | End: 2019-08-31 | Stop reason: HOSPADM

## 2019-08-30 RX ORDER — SODIUM CHLORIDE 0.9 % (FLUSH) 0.9 %
3 SYRINGE (ML) INJECTION EVERY 12 HOURS SCHEDULED
Status: DISCONTINUED | OUTPATIENT
Start: 2019-08-30 | End: 2019-08-30 | Stop reason: HOSPADM

## 2019-08-30 RX ORDER — MIDAZOLAM HYDROCHLORIDE 1 MG/ML
INJECTION INTRAMUSCULAR; INTRAVENOUS AS NEEDED
Status: DISCONTINUED | OUTPATIENT
Start: 2019-08-30 | End: 2019-08-30 | Stop reason: HOSPADM

## 2019-08-30 RX ORDER — BUPIVACAINE HYDROCHLORIDE 5 MG/ML
INJECTION, SOLUTION EPIDURAL; INTRACAUDAL AS NEEDED
Status: DISCONTINUED | OUTPATIENT
Start: 2019-08-30 | End: 2019-08-30 | Stop reason: HOSPADM

## 2019-08-30 RX ORDER — ACETAMINOPHEN 325 MG/1
650 TABLET ORAL EVERY 4 HOURS PRN
Status: DISCONTINUED | OUTPATIENT
Start: 2019-08-30 | End: 2019-08-31 | Stop reason: HOSPADM

## 2019-08-30 RX ORDER — SODIUM CHLORIDE 0.9 % (FLUSH) 0.9 %
1-10 SYRINGE (ML) INJECTION AS NEEDED
Status: DISCONTINUED | OUTPATIENT
Start: 2019-08-30 | End: 2019-08-30 | Stop reason: HOSPADM

## 2019-08-30 RX ORDER — SODIUM CHLORIDE 9 MG/ML
INJECTION, SOLUTION INTRAVENOUS CONTINUOUS PRN
Status: COMPLETED | OUTPATIENT
Start: 2019-08-30 | End: 2019-08-30

## 2019-08-30 RX ORDER — ONDANSETRON 2 MG/ML
INJECTION INTRAMUSCULAR; INTRAVENOUS AS NEEDED
Status: DISCONTINUED | OUTPATIENT
Start: 2019-08-30 | End: 2019-08-30 | Stop reason: HOSPADM

## 2019-08-30 RX ORDER — FUROSEMIDE 40 MG/1
40 TABLET ORAL EVERY MORNING
Status: DISCONTINUED | OUTPATIENT
Start: 2019-08-30 | End: 2019-08-31 | Stop reason: HOSPADM

## 2019-08-30 RX ORDER — SODIUM CHLORIDE 0.9 % (FLUSH) 0.9 %
3 SYRINGE (ML) INJECTION EVERY 12 HOURS SCHEDULED
Status: DISCONTINUED | OUTPATIENT
Start: 2019-08-30 | End: 2019-08-31 | Stop reason: HOSPADM

## 2019-08-30 RX ORDER — LIDOCAINE HYDROCHLORIDE 10 MG/ML
INJECTION, SOLUTION INFILTRATION; PERINEURAL AS NEEDED
Status: DISCONTINUED | OUTPATIENT
Start: 2019-08-30 | End: 2019-08-30 | Stop reason: HOSPADM

## 2019-08-30 RX ORDER — VANCOMYCIN HYDROCHLORIDE 1 G/200ML
15 INJECTION, SOLUTION INTRAVENOUS ONCE
Status: COMPLETED | OUTPATIENT
Start: 2019-08-31 | End: 2019-08-31

## 2019-08-30 RX ORDER — PANTOPRAZOLE SODIUM 40 MG/1
40 TABLET, DELAYED RELEASE ORAL EVERY MORNING
Status: DISCONTINUED | OUTPATIENT
Start: 2019-08-30 | End: 2019-08-31 | Stop reason: HOSPADM

## 2019-08-30 RX ORDER — ACETAMINOPHEN 650 MG/1
650 SUPPOSITORY RECTAL EVERY 4 HOURS PRN
Status: DISCONTINUED | OUTPATIENT
Start: 2019-08-30 | End: 2019-08-31 | Stop reason: HOSPADM

## 2019-08-30 RX ORDER — ACETAMINOPHEN 160 MG/5ML
650 SOLUTION ORAL EVERY 4 HOURS PRN
Status: DISCONTINUED | OUTPATIENT
Start: 2019-08-30 | End: 2019-08-31 | Stop reason: HOSPADM

## 2019-08-30 RX ORDER — ACETAMINOPHEN 325 MG/1
650 TABLET ORAL EVERY 4 HOURS PRN
Status: DISCONTINUED | OUTPATIENT
Start: 2019-08-30 | End: 2019-08-30 | Stop reason: HOSPADM

## 2019-08-30 RX ADMIN — SODIUM CHLORIDE, PRESERVATIVE FREE 3 ML: 5 INJECTION INTRAVENOUS at 13:36

## 2019-08-30 RX ADMIN — SODIUM CHLORIDE, PRESERVATIVE FREE 3 ML: 5 INJECTION INTRAVENOUS at 21:21

## 2019-08-30 RX ADMIN — HYDROCODONE BITARTRATE AND ACETAMINOPHEN 1 TABLET: 5; 325 TABLET ORAL at 21:20

## 2019-08-30 RX ADMIN — LEVOTHYROXINE SODIUM 50 MCG: 50 TABLET ORAL at 21:20

## 2019-08-30 RX ADMIN — ATORVASTATIN CALCIUM 20 MG: 20 TABLET, FILM COATED ORAL at 21:20

## 2019-08-30 RX ADMIN — CARVEDILOL 12.5 MG: 12.5 TABLET, FILM COATED ORAL at 13:36

## 2019-08-30 RX ADMIN — TERAZOSIN HYDROCHLORIDE ANHYDROUS 5 MG: 5 CAPSULE ORAL at 21:20

## 2019-08-30 RX ADMIN — CARVEDILOL 12.5 MG: 12.5 TABLET, FILM COATED ORAL at 21:20

## 2019-08-30 RX ADMIN — PANTOPRAZOLE SODIUM 40 MG: 40 TABLET, DELAYED RELEASE ORAL at 13:35

## 2019-08-30 RX ADMIN — HYDROCODONE BITARTRATE AND ACETAMINOPHEN 1 TABLET: 5; 325 TABLET ORAL at 11:41

## 2019-08-30 RX ADMIN — VANCOMYCIN HYDROCHLORIDE 1000 MG: 1 INJECTION, SOLUTION INTRAVENOUS at 08:36

## 2019-08-30 RX ADMIN — FUROSEMIDE 40 MG: 40 TABLET ORAL at 17:37

## 2019-08-31 ENCOUNTER — APPOINTMENT (OUTPATIENT)
Dept: GENERAL RADIOLOGY | Facility: HOSPITAL | Age: 79
End: 2019-08-31

## 2019-08-31 VITALS
TEMPERATURE: 98.5 F | OXYGEN SATURATION: 93 % | HEART RATE: 71 BPM | DIASTOLIC BLOOD PRESSURE: 73 MMHG | SYSTOLIC BLOOD PRESSURE: 114 MMHG | BODY MASS INDEX: 28.74 KG/M2 | HEIGHT: 63 IN | WEIGHT: 162.2 LBS | RESPIRATION RATE: 18 BRPM

## 2019-08-31 LAB
ANION GAP SERPL CALCULATED.3IONS-SCNC: 12 MMOL/L (ref 5–15)
BUN BLD-MCNC: 31 MG/DL (ref 8–23)
BUN/CREAT SERPL: 23.5 (ref 7–25)
CALCIUM SPEC-SCNC: 8.9 MG/DL (ref 8.6–10.5)
CHLORIDE SERPL-SCNC: 106 MMOL/L (ref 98–107)
CO2 SERPL-SCNC: 25 MMOL/L (ref 22–29)
CREAT BLD-MCNC: 1.32 MG/DL (ref 0.57–1)
GFR SERPL CREATININE-BSD FRML MDRD: 39 ML/MIN/1.73
GLUCOSE BLD-MCNC: 98 MG/DL (ref 65–99)
POTASSIUM BLD-SCNC: 4.2 MMOL/L (ref 3.5–5.2)
SODIUM BLD-SCNC: 143 MMOL/L (ref 136–145)

## 2019-08-31 PROCEDURE — 63710000001 FUROSEMIDE 40 MG TABLET: Performed by: PHYSICIAN ASSISTANT

## 2019-08-31 PROCEDURE — 63710000001 HYDROCODONE-ACETAMINOPHEN 5-325 MG TABLET: Performed by: INTERNAL MEDICINE

## 2019-08-31 PROCEDURE — 63710000001 CARVEDILOL 12.5 MG TABLET: Performed by: PHYSICIAN ASSISTANT

## 2019-08-31 PROCEDURE — 71046 X-RAY EXAM CHEST 2 VIEWS: CPT

## 2019-08-31 PROCEDURE — 99024 POSTOP FOLLOW-UP VISIT: CPT | Performed by: PHYSICIAN ASSISTANT

## 2019-08-31 PROCEDURE — 63710000001 PANTOPRAZOLE 40 MG TABLET DELAYED-RELEASE: Performed by: PHYSICIAN ASSISTANT

## 2019-08-31 PROCEDURE — A9270 NON-COVERED ITEM OR SERVICE: HCPCS | Performed by: INTERNAL MEDICINE

## 2019-08-31 PROCEDURE — A9270 NON-COVERED ITEM OR SERVICE: HCPCS | Performed by: PHYSICIAN ASSISTANT

## 2019-08-31 PROCEDURE — 80048 BASIC METABOLIC PNL TOTAL CA: CPT | Performed by: INTERNAL MEDICINE

## 2019-08-31 PROCEDURE — 25010000002 VANCOMYCIN PER 500 MG: Performed by: INTERNAL MEDICINE

## 2019-08-31 RX ORDER — FUROSEMIDE 40 MG/1
40 TABLET ORAL EVERY MORNING
Qty: 90 TABLET | Refills: 3 | Status: SHIPPED | OUTPATIENT
Start: 2019-08-31 | End: 2020-08-28

## 2019-08-31 RX ADMIN — VANCOMYCIN HYDROCHLORIDE 1000 MG: 1 INJECTION, SOLUTION INTRAVENOUS at 04:22

## 2019-08-31 RX ADMIN — PANTOPRAZOLE SODIUM 40 MG: 40 TABLET, DELAYED RELEASE ORAL at 05:41

## 2019-08-31 RX ADMIN — CARVEDILOL 12.5 MG: 12.5 TABLET, FILM COATED ORAL at 08:34

## 2019-08-31 RX ADMIN — FUROSEMIDE 40 MG: 40 TABLET ORAL at 05:41

## 2019-08-31 RX ADMIN — HYDROCODONE BITARTRATE AND ACETAMINOPHEN 1 TABLET: 5; 325 TABLET ORAL at 04:37

## 2019-09-03 ENCOUNTER — HOSPITAL ENCOUNTER (OUTPATIENT)
Dept: GENERAL RADIOLOGY | Facility: HOSPITAL | Age: 79
Discharge: HOME | End: 2019-09-03
Admitting: INTERNAL MEDICINE

## 2019-09-03 ENCOUNTER — CLINICAL SUPPORT NO REQUIREMENTS (OUTPATIENT)
Dept: CARDIOLOGY | Facility: CLINIC | Age: 79
End: 2019-09-03

## 2019-09-03 ENCOUNTER — TELEPHONE (OUTPATIENT)
Dept: CARDIOLOGY | Facility: CLINIC | Age: 79
End: 2019-09-03

## 2019-09-03 DIAGNOSIS — Z95.0 PRESENCE OF BIVENTRICULAR CARDIAC PACEMAKER: Primary | ICD-10-CM

## 2019-09-03 DIAGNOSIS — Z95.0 PRESENCE OF BIVENTRICULAR CARDIAC PACEMAKER: ICD-10-CM

## 2019-09-03 PROCEDURE — 71046 X-RAY EXAM CHEST 2 VIEWS: CPT

## 2019-09-03 NOTE — TELEPHONE ENCOUNTER
"Pt called this morning to let us know she has had \"thumping\" in her chest since discharge.  Pt was s/p upgrade to biv on 8/30 and sounds to be diaphragm stim, its positional and is causing her discomfort.  She was told that it was probably gas prior to discharge.  Pt will try to arrange for daughter to bring her to [place] so we can make necessary adjustments to her pacemaker.  "

## 2019-09-04 DIAGNOSIS — I49.5 TACHY-BRADY SYNDROME (HCC): Primary | ICD-10-CM

## 2019-09-06 ENCOUNTER — CLINICAL SUPPORT NO REQUIREMENTS (OUTPATIENT)
Dept: CARDIOLOGY | Facility: CLINIC | Age: 79
End: 2019-09-06

## 2019-09-06 DIAGNOSIS — I49.5 TACHY-BRADY SYNDROME (HCC): Primary | ICD-10-CM

## 2019-09-10 ENCOUNTER — OFFICE VISIT (OUTPATIENT)
Dept: CARDIOLOGY | Facility: CLINIC | Age: 79
End: 2019-09-10

## 2019-09-10 DIAGNOSIS — I48.19 PERSISTENT ATRIAL FIBRILLATION (HCC): Primary | ICD-10-CM

## 2019-09-10 PROCEDURE — 99024 POSTOP FOLLOW-UP VISIT: CPT | Performed by: INTERNAL MEDICINE

## 2019-09-10 NOTE — PROGRESS NOTES
WOUND CHECK    09/10/2019    Tania Schreiber, : 1940    WOUND CHECK    B/P:  (Sitting) 130/80 RIGHT ARM  (Standing)     Pulse: 70    Patient has fever: [] Temperature if indicated: 97.8    Wound Location: LEFT INFRACLAVICULAR     Dressing Removed [x]        Old Dressing Appearance:  Clean, dry [x]                 Old, bloody drainage []                            Moist, serous drainage []                Moist, thick yellow/green drainage []       Wound Appearance: Redness []                  Drainage []                  Culture obtained []        Color: N/A     Consistency: NA     Amount: none         Gloves used, wound cleansed with sterile 4x4 and peroxide [x]       MD notified [] MD orders:   Antibiotic started []  If checked, type   Other:     Appointment for follow-up scheduled for 3 months post procedure [x]    Future Appointments   Date Time Provider Department Center   2019 11:30 AM Parth Almazan MD [facility] None   3/11/2020  1:00 PM [facility] ECHO[facility]VASC [facility]  [facility]   3/11/2020  2:45 PM Parth Almazan MD [facility] None           Brisa Wagner MA, 09/10/19      MD Signature:______________________________ Completed By/Date:

## 2019-11-06 RX ORDER — APIXABAN 2.5 MG/1
TABLET, FILM COATED ORAL
Qty: 60 TABLET | Refills: 6 | Status: SHIPPED | OUTPATIENT
Start: 2019-11-06 | End: 2019-12-18 | Stop reason: SDUPTHER

## 2019-12-18 ENCOUNTER — LAB REQUISITION (OUTPATIENT)
Dept: LAB | Facility: HOSPITAL | Age: 79
End: 2019-12-18

## 2019-12-18 ENCOUNTER — OFFICE VISIT (OUTPATIENT)
Dept: CARDIOLOGY | Facility: CLINIC | Age: 79
End: 2019-12-18

## 2019-12-18 ENCOUNTER — LAB (OUTPATIENT)
Dept: LAB | Facility: HOSPITAL | Age: 79
End: 2019-12-18

## 2019-12-18 VITALS
BODY MASS INDEX: 27.18 KG/M2 | SYSTOLIC BLOOD PRESSURE: 124 MMHG | OXYGEN SATURATION: 95 % | HEART RATE: 70 BPM | DIASTOLIC BLOOD PRESSURE: 80 MMHG | WEIGHT: 159.2 LBS | HEIGHT: 64 IN

## 2019-12-18 DIAGNOSIS — I10 ESSENTIAL HYPERTENSION: ICD-10-CM

## 2019-12-18 DIAGNOSIS — I49.5 TACHY-BRADY SYNDROME (HCC): ICD-10-CM

## 2019-12-18 DIAGNOSIS — I42.0 CARDIOMYOPATHY, DILATED (HCC): ICD-10-CM

## 2019-12-18 DIAGNOSIS — I48.19 OTHER PERSISTENT ATRIAL FIBRILLATION (HCC): ICD-10-CM

## 2019-12-18 DIAGNOSIS — I42.0 DILATED CARDIOMYOPATHY (HCC): ICD-10-CM

## 2019-12-18 DIAGNOSIS — I48.19 PERSISTENT ATRIAL FIBRILLATION (HCC): ICD-10-CM

## 2019-12-18 DIAGNOSIS — I49.5 SICK SINUS SYNDROME (HCC): ICD-10-CM

## 2019-12-18 DIAGNOSIS — R06.09 DOE (DYSPNEA ON EXERTION): Primary | ICD-10-CM

## 2019-12-18 DIAGNOSIS — Z00.00 ROUTINE GENERAL MEDICAL EXAMINATION AT A HEALTH CARE FACILITY: ICD-10-CM

## 2019-12-18 PROBLEM — R07.9 CHEST PAIN: Status: ACTIVE | Noted: 2019-12-18

## 2019-12-18 LAB
ALBUMIN SERPL-MCNC: 4.3 G/DL (ref 3.5–5.2)
ALBUMIN/GLOB SERPL: 1.7 G/DL
ALP SERPL-CCNC: 91 U/L (ref 39–117)
ALT SERPL W P-5'-P-CCNC: 8 U/L (ref 1–33)
ANION GAP SERPL CALCULATED.3IONS-SCNC: 10 MMOL/L (ref 5–15)
AST SERPL-CCNC: 15 U/L (ref 1–32)
BASOPHILS # BLD AUTO: 0.04 10*3/MM3 (ref 0–0.2)
BASOPHILS NFR BLD AUTO: 0.7 % (ref 0–1.5)
BILIRUB SERPL-MCNC: 0.4 MG/DL (ref 0.2–1.2)
BUN BLD-MCNC: 23 MG/DL (ref 8–23)
BUN/CREAT SERPL: 16.7 (ref 7–25)
CALCIUM SPEC-SCNC: 10.1 MG/DL (ref 8.6–10.5)
CHLORIDE SERPL-SCNC: 103 MMOL/L (ref 98–107)
CO2 SERPL-SCNC: 30 MMOL/L (ref 22–29)
CREAT BLD-MCNC: 1.38 MG/DL (ref 0.57–1)
DEPRECATED RDW RBC AUTO: 45.4 FL (ref 37–54)
EOSINOPHIL # BLD AUTO: 0.34 10*3/MM3 (ref 0–0.4)
EOSINOPHIL NFR BLD AUTO: 6.1 % (ref 0.3–6.2)
ERYTHROCYTE [DISTWIDTH] IN BLOOD BY AUTOMATED COUNT: 13.2 % (ref 12.3–15.4)
GFR SERPL CREATININE-BSD FRML MDRD: 37 ML/MIN/1.73
GLOBULIN UR ELPH-MCNC: 2.5 GM/DL
GLUCOSE BLD-MCNC: 113 MG/DL (ref 65–99)
HCT VFR BLD AUTO: 41 % (ref 34–46.6)
HGB BLD-MCNC: 13.2 G/DL (ref 12–15.9)
IMM GRANULOCYTES # BLD AUTO: 0.01 10*3/MM3 (ref 0–0.05)
IMM GRANULOCYTES NFR BLD AUTO: 0.2 % (ref 0–0.5)
LYMPHOCYTES # BLD AUTO: 1.98 10*3/MM3 (ref 0.7–3.1)
LYMPHOCYTES NFR BLD AUTO: 35.4 % (ref 19.6–45.3)
MCH RBC QN AUTO: 30.2 PG (ref 26.6–33)
MCHC RBC AUTO-ENTMCNC: 32.2 G/DL (ref 31.5–35.7)
MCV RBC AUTO: 93.8 FL (ref 79–97)
MONOCYTES # BLD AUTO: 0.37 10*3/MM3 (ref 0.1–0.9)
MONOCYTES NFR BLD AUTO: 6.6 % (ref 5–12)
NEUTROPHILS # BLD AUTO: 2.86 10*3/MM3 (ref 1.7–7)
NEUTROPHILS NFR BLD AUTO: 51 % (ref 42.7–76)
NRBC BLD AUTO-RTO: 0 /100 WBC (ref 0–0.2)
PLATELET # BLD AUTO: 194 10*3/MM3 (ref 140–450)
PMV BLD AUTO: 10 FL (ref 6–12)
POTASSIUM BLD-SCNC: 4.7 MMOL/L (ref 3.5–5.2)
PROT SERPL-MCNC: 6.8 G/DL (ref 6–8.5)
RBC # BLD AUTO: 4.37 10*6/MM3 (ref 3.77–5.28)
SODIUM BLD-SCNC: 143 MMOL/L (ref 136–145)
T4 FREE SERPL-MCNC: 1.29 NG/DL (ref 0.93–1.7)
TSH SERPL DL<=0.05 MIU/L-ACNC: 7.14 UIU/ML (ref 0.27–4.2)
WBC NRBC COR # BLD: 5.6 10*3/MM3 (ref 3.4–10.8)

## 2019-12-18 PROCEDURE — 36415 COLL VENOUS BLD VENIPUNCTURE: CPT | Performed by: INTERNAL MEDICINE

## 2019-12-18 PROCEDURE — 99214 OFFICE O/P EST MOD 30 MIN: CPT | Performed by: INTERNAL MEDICINE

## 2019-12-18 PROCEDURE — 84439 ASSAY OF FREE THYROXINE: CPT | Performed by: INTERNAL MEDICINE

## 2019-12-18 PROCEDURE — 80053 COMPREHEN METABOLIC PANEL: CPT | Performed by: INTERNAL MEDICINE

## 2019-12-18 PROCEDURE — 84443 ASSAY THYROID STIM HORMONE: CPT | Performed by: INTERNAL MEDICINE

## 2019-12-18 PROCEDURE — 93281 PM DEVICE PROGR EVAL MULTI: CPT | Performed by: INTERNAL MEDICINE

## 2019-12-18 PROCEDURE — 85025 COMPLETE CBC W/AUTO DIFF WBC: CPT | Performed by: INTERNAL MEDICINE

## 2019-12-18 NOTE — PROGRESS NOTES
Tania Schreiber  1940    There is no work phone number on file.      12/18/2019    [facility] CARDIOLOGY     Price, Joe Renteria MD  [address] [place], [place] [id number]    Chief Complaint   Patient presents with   • Atrial Fibrillation   • NARAYANAN       Problem List:   1. Persistent atrial fibrillation/tachy-domo syndrome  a. CHADSVASc = 4 (age >75, female, HTN) on Eliquis  b. S/p ISABELA/ECV, 10/2016  c. ISABELA 10/27/16: EF 45-50%, mild to moderate MR, moderate TR  d. Previously treated with amiodarone, discontinued secondary to thyroid toxicity  e. Echocardiogram 4/12/17: EF 60-65%, no significant valvular disease  f. Echocardiogram 3/6/19: EF 56-60%, mild AS.   g. [employer] Chidi DDD pacemaker implant plus AV node ablation 3/26/2019  h. Initiated Flecainide 5/2019  i. Echocardiogram 7/2/2019 EF 41 to 45%  j. Flecainide discontinued due to drop in EF July 2019  k. Echocardiogram 8/29/19: EF 31-35%  l. S/p upgrade to Bi-V PPM, 8/30/19  2. Abnormal MPS:  a. MPS 10/24/16: EF 63%, medium, completely reversible, mid to basal-anteroseptal defect  b. Left heart catheterization 10/25/16: No significant coronary artery disease  3. Hypertension  4. Hyperlipidemia  5. Hypothyroidism  6. GERD  7. Surgical history:  a. Left knee surgery  b. Bilateral cataract surgery  c. Jaw tumor removal  d. Tonsillectomy  e. Cholecystectomy    Allergies  No Known Allergies    Current Medications    Current Outpatient Medications:   •  ALPRAZolam (XANAX) 0.5 MG tablet, Take 0.25-0.5 mg by mouth 2 (Two) Times a Day As Needed for Anxiety., Disp: , Rfl:   •  apixaban (ELIQUIS) 2.5 MG tablet tablet, Take 1 tablet by mouth Every 12 (Twelve) Hours., Disp: 60 tablet, Rfl: 5  •  atorvastatin (LIPITOR) 20 MG tablet, Take 20 mg by mouth Every Night., Disp: , Rfl:   •  carvedilol (COREG) 12.5 MG tablet, Take 1 tablet by mouth 2 (Two) Times a Day., Disp: 60 tablet, Rfl: 6  •  doxazosin (CARDURA) 4 MG tablet, Take 1 tablet by mouth Every  "Night., Disp: 30 tablet, Rfl: 6  •  fluticasone (FLONASE) 50 MCG/ACT nasal spray, 2 sprays into the nostril(s) as directed by provider Daily As Needed for Rhinitis or Allergies., Disp: , Rfl:   •  furosemide (LASIX) 40 MG tablet, Take 1 tablet by mouth Every Morning., Disp: 90 tablet, Rfl: 3  •  lansoprazole (PREVACID) 30 MG capsule, Take 30 mg by mouth Every Morning., Disp: , Rfl:   •  levothyroxine (SYNTHROID, LEVOTHROID) 50 MCG tablet, Take 50 mcg by mouth Every Night., Disp: , Rfl:   •  Multiple Vitamins-Minerals (PRESERVISION AREDS PO), Take 2 tablets by mouth Every Morning., Disp: , Rfl:   •  Polyethylene Glycol 3350 (MIRALAX PO), Take 1 dose by mouth Daily As Needed (constipation)., Disp: , Rfl:     History of Present Illness   HPI    Pt presents for follow up of atrial fibrillation, tachy-domo syndrome, s/p AVN ablation + PPM implant in the past with recent upgrade to Bi-V PPM in August secondary to reduction in LVEF. Since we last saw the pt, she does not feel much improved since her upgrade.  She notes moderately severe symptoms of fatigue and dyspnea with any exertional activities.  She also c/o dull chest discomfort that radiates to her back that usually occurs after exertional activities and gets better after she sits down and rests.  Occasional dizziness, no presyncope or syncope.  BP's normal on home BP log.  Denies any hospitalizations, ER visits, bleeding, or TIA/CVA symptoms.     ROS:  General:  + fatigue, no weight gain or loss  Cardiovascular:  + CP, no PND, syncope, near syncope, + edema, no palpitations.  Pulmonary:  + NARAYANAN, NO cough, or wheezing      Vitals:    12/18/19 1114   BP: 124/80   BP Location: Left arm   Patient Position: Sitting   Pulse: 70   SpO2: 95%   Weight: 72.2 kg (159 lb 3.2 oz)   Height: 161.3 cm (63.5\")     PE:  General: NAD  Neck: no JVD, no carotid bruits, no TM  Heart RRR, NL S1, S2, no rubs, murmurs  Lungs: CTA, no wheezes, rhonchi, or rales  Abd: soft, non-tender, NL " BS  Ext: No musculoskeletal deformities, trace LE edema, no cyanosis, or clubbing  Psych: normal mood and affect    Diagnostic Data:      Procedures    1. NARAYANAN (dyspnea on exertion)    2. Cardiomyopathy, dilated (CMS/HCC)    3. Persistent atrial fibrillation    4. Tachy-domo syndrome (CMS/HCC)    5. Essential hypertension          Plan:  1) NARAYANAN/DCM: unchanged despite upgrade to BiV pacing: Needs VVI echo. Add RR today to device; Check CBC/CMP/TSH, T4  2) Persistent atrial fibrillation:  - Now chronic in nature.  S/p AVN ablation.    - Continue present medications.   3) Anticoagulation:  - CHADSVASc = 4, on Eliquis  4) Tachy-domo syndrome:  - S/p PPM implant in the past with recent upgrade to Bi-V PPM in September.  Device with normal function.  100% Bi-V pacing.  .    5) CP:  - Had reportedly normal LHC in 2016.  Need to obtain films for review.  6) HTN:  - Well controlled on Cardura  - Wt loss, exercise, salt reduction    F/up in 3 months    Scribed for Parth Almazan MD by Netta Winston, CANDY. 12/18/2019  12:00 PM    I, Parth Almazan MD, personally performed the services described in this documentation as scribed by the above named individual in my presence, and it is both accurate and complete.  12/18/2019  12:00 PM

## 2019-12-23 ENCOUNTER — TELEPHONE (OUTPATIENT)
Dept: CARDIOLOGY | Facility: CLINIC | Age: 79
End: 2019-12-23

## 2019-12-23 ENCOUNTER — HOSPITAL ENCOUNTER (OUTPATIENT)
Dept: CARDIOLOGY | Facility: HOSPITAL | Age: 79
Discharge: HOME | End: 2019-12-23

## 2019-12-23 ENCOUNTER — DOCUMENTATION (OUTPATIENT)
Dept: CARDIOLOGY | Facility: CLINIC | Age: 79
End: 2019-12-23

## 2019-12-23 DIAGNOSIS — Z00.6 EXAMINATION FOR NORMAL COMPARISON OR CONTROL IN CLINICAL RESEARCH: ICD-10-CM

## 2019-12-23 NOTE — TELEPHONE ENCOUNTER
2016 [facility] films received and given to Dr. Thornton to review at Dr. Almazan's request.

## 2020-01-03 ENCOUNTER — HOSPITAL ENCOUNTER (OUTPATIENT)
Dept: CARDIOLOGY | Facility: HOSPITAL | Age: 80
Discharge: HOME | End: 2020-01-03
Admitting: INTERNAL MEDICINE

## 2020-01-03 VITALS — HEIGHT: 63 IN | WEIGHT: 159 LBS | BODY MASS INDEX: 28.17 KG/M2

## 2020-01-03 DIAGNOSIS — I42.0 CARDIOMYOPATHY, DILATED (HCC): ICD-10-CM

## 2020-01-03 DIAGNOSIS — I49.5 TACHY-BRADY SYNDROME (HCC): ICD-10-CM

## 2020-01-03 DIAGNOSIS — I48.19 PERSISTENT ATRIAL FIBRILLATION (HCC): ICD-10-CM

## 2020-01-03 LAB
BH CV ECHO MEAS - EDV(CUBED): 65.5 ML
BH CV ECHO MEAS - EDV(TEICH): 71.3 ML
BH CV ECHO MEAS - EF(CUBED): 76.1 %
BH CV ECHO MEAS - EF(TEICH): 68.7 %
BH CV ECHO MEAS - ESV(CUBED): 15.6 ML
BH CV ECHO MEAS - ESV(TEICH): 22.3 ML
BH CV ECHO MEAS - FS: 38 %
BH CV ECHO MEAS - IVS/LVPW: 0.95
BH CV ECHO MEAS - IVSD: 1.2 CM
BH CV ECHO MEAS - LV MASS(C)D: 171.5 GRAMS
BH CV ECHO MEAS - LVIDD: 4 CM
BH CV ECHO MEAS - LVIDS: 2.5 CM
BH CV ECHO MEAS - LVPWD: 1.3 CM
BH CV ECHO MEAS - RAP SYSTOLE: 10 MMHG
BH CV ECHO MEAS - SV(CUBED): 49.8 ML
BH CV ECHO MEAS - SV(TEICH): 48.9 ML
MAXIMAL PREDICTED HEART RATE: 139 BPM
STRESS TARGET HR: 118 BPM

## 2020-01-03 PROCEDURE — 93288 INTERROG EVL PM/LDLS PM IP: CPT | Performed by: INTERNAL MEDICINE

## 2020-01-03 PROCEDURE — 93308 TTE F-UP OR LMTD: CPT

## 2020-01-03 PROCEDURE — 93308 TTE F-UP OR LMTD: CPT | Performed by: INTERNAL MEDICINE

## 2020-01-14 ENCOUNTER — DOCUMENTATION (OUTPATIENT)
Dept: CARDIOLOGY | Facility: CLINIC | Age: 80
End: 2020-01-14

## 2020-01-14 NOTE — PROGRESS NOTES
I called Tania today.  Dr. Thornton reviewed her left heart catheterization from 2016.  His recommendation at this time would be to pursue Lexiscan ischemic study to further delineate myocardial ischemia as a possible source of her symptoms.  Per our telephone conversation, she continues to complain of chest tightness as well as shortness of breath and easy fatigability with minimal exertion.

## 2020-01-15 DIAGNOSIS — R07.89 OTHER CHEST PAIN: Primary | ICD-10-CM

## 2020-01-30 ENCOUNTER — CLINICAL SUPPORT NO REQUIREMENTS (OUTPATIENT)
Dept: CARDIOLOGY | Facility: CLINIC | Age: 80
End: 2020-01-30

## 2020-01-30 DIAGNOSIS — I42.0 CARDIOMYOPATHY, DILATED (HCC): Primary | ICD-10-CM

## 2020-01-30 DIAGNOSIS — I49.5 TACHY-BRADY SYNDROME (HCC): ICD-10-CM

## 2020-01-30 PROCEDURE — 93296 REM INTERROG EVL PM/IDS: CPT | Performed by: INTERNAL MEDICINE

## 2020-01-30 PROCEDURE — 93294 REM INTERROG EVL PM/LDLS PM: CPT | Performed by: INTERNAL MEDICINE

## 2020-02-03 RX ORDER — CARVEDILOL 12.5 MG/1
TABLET ORAL
Qty: 180 TABLET | Refills: 1 | Status: SHIPPED | OUTPATIENT
Start: 2020-02-03 | End: 2020-08-13

## 2020-02-07 LAB
ALBUMIN SERPL-MCNC: 4.3 G/DL (ref 3.5–5.2)
ALBUMIN/GLOB SERPL: 1.7 G/DL
ALP SERPL-CCNC: 91 U/L (ref 39–117)
ALT SERPL-CCNC: 8 U/L (ref 1–33)
AST SERPL-CCNC: 15 U/L (ref 1–32)
BASOPHILS # BLD AUTO: 0.04 10E3/MM3 (ref 0–0.2)
BASOPHILS NFR BLD AUTO: 0.7 % (ref 0–1.5)
BILIRUB SERPL-MCNC: 0.4 MG/DL (ref 0.2–1.2)
BUN SERPL-MCNC: 23 MG/DL (ref 8–23)
BUN/CREAT SERPL: 16.7 (ref 7–25)
CALCIUM SERPL-MCNC: 10.1 MG/DL (ref 8.6–10.5)
CHLORIDE SERPL-SCNC: 103 MMOL/L (ref 98–107)
CO2 SERPL-SCNC: 30 MMOL/L (ref 22–29)
CREAT SERPL-MCNC: 1.38 MG/DL (ref 0.57–1)
EOSINOPHIL # BLD AUTO: 0.34 10E3/MM3 (ref 0–0.4)
EOSINOPHIL NFR BLD AUTO: 6.1 % (ref 0.3–6.2)
ERYTHROCYTE [DISTWIDTH] IN BLOOD BY AUTOMATED COUNT: 13.2 % (ref 12.3–15.4)
GLOBULIN SER CALC-MCNC: 2.5 GM/DL
GLUCOSE SERPL-MCNC: 113 MG/DL (ref 65–99)
HCT VFR BLD AUTO: 41 % (ref 34–46.6)
HGB BLD-MCNC: 13.2 G/DL (ref 12–15.9)
IMM GRANULOCYTES # BLD AUTO: 0.01 10E3/MM3 (ref 0–0.05)
IMM GRANULOCYTES NFR BLD AUTO: 0.2 % (ref 0–0.5)
LYMPHOCYTES # BLD AUTO: 1.98 10E3/MM3 (ref 0.7–3.1)
LYMPHOCYTES NFR BLD AUTO: 35.4 % (ref 19.6–45.3)
MCH RBC QN AUTO: 30.2 PG (ref 26.6–33)
MCHC RBC AUTO-ENTMCNC: 32.2 G/DL (ref 31.5–35.7)
MCV RBC AUTO: 93.8 FL (ref 79–97)
MONOCYTES # BLD AUTO: 0.37 10E3/MM3 (ref 0.1–0.9)
MONOCYTES NFR BLD AUTO: 6.6 % (ref 5–12)
NEUTROPHILS # BLD AUTO: 2.86 10E3/MM3 (ref 1.7–7)
NEUTROPHILS NFR BLD AUTO: 51 % (ref 42.7–76)
PLATELET # BLD AUTO: 194 10E3/MM3 (ref 140–450)
POTASSIUM SERPL-SCNC: 4.7 MMOL/L (ref 3.5–5.2)
PROT SERPL-MCNC: 6.8 G/DL (ref 6–8.5)
RBC # BLD AUTO: 4.37 10E6/MM3 (ref 3.77–5.28)
SODIUM SERPL-SCNC: 143 MMOL/L (ref 136–145)
T4 FREE SERPL-MCNC: 1.29 NG/DL (ref 0.89–1.76)
TSH SERPL DL<=0.005 MIU/L-ACNC: 7.14 MIU/ML (ref 0.27–4.2)
WBC # BLD AUTO: 5.6 10E3/MM3 (ref 3.4–10.8)

## 2020-02-14 ENCOUNTER — HOSPITAL ENCOUNTER (OUTPATIENT)
Dept: CARDIOLOGY | Facility: HOSPITAL | Age: 80
Discharge: HOME | End: 2020-02-14

## 2020-02-14 ENCOUNTER — HOSPITAL ENCOUNTER (OUTPATIENT)
Dept: CARDIOLOGY | Facility: HOSPITAL | Age: 80
Discharge: HOME | End: 2020-02-14
Admitting: INTERNAL MEDICINE

## 2020-02-14 VITALS
HEART RATE: 72 BPM | HEIGHT: 63 IN | DIASTOLIC BLOOD PRESSURE: 90 MMHG | BODY MASS INDEX: 29.06 KG/M2 | WEIGHT: 164 LBS | SYSTOLIC BLOOD PRESSURE: 142 MMHG

## 2020-02-14 DIAGNOSIS — R07.89 OTHER CHEST PAIN: ICD-10-CM

## 2020-02-14 LAB
BH CV STRESS BP STAGE 1: NORMAL
BH CV STRESS BP STAGE 3: NORMAL
BH CV STRESS COMMENTS STAGE 1: NORMAL
BH CV STRESS DOSE REGADENOSON STAGE 1: 0.4
BH CV STRESS DURATION MIN STAGE 1: 1
BH CV STRESS DURATION MIN STAGE 2: 1
BH CV STRESS DURATION MIN STAGE 3: 1
BH CV STRESS DURATION MIN STAGE 4: 1
BH CV STRESS DURATION SEC STAGE 2: 0
BH CV STRESS HR STAGE 1: 70
BH CV STRESS HR STAGE 2: 70
BH CV STRESS HR STAGE 3: 70
BH CV STRESS HR STAGE 4: 71
BH CV STRESS PROTOCOL 1: NORMAL
BH CV STRESS STAGE 1: 1
BH CV STRESS STAGE 2: 2
BH CV STRESS STAGE 3: 3
BH CV STRESS STAGE 4: 4
LV EF NUC BP: 63 %
MAXIMAL PREDICTED HEART RATE: 139 BPM
STRESS BASELINE BP: NORMAL MMHG
STRESS BASELINE HR: 70 BPM
STRESS TARGET HR: 118 BPM

## 2020-02-14 PROCEDURE — 78452 HT MUSCLE IMAGE SPECT MULT: CPT | Performed by: INTERNAL MEDICINE

## 2020-02-14 PROCEDURE — A9500 TC99M SESTAMIBI: HCPCS | Performed by: INTERNAL MEDICINE

## 2020-02-14 PROCEDURE — 25010000002 REGADENOSON 0.4 MG/5ML SOLUTION: Performed by: INTERNAL MEDICINE

## 2020-02-14 PROCEDURE — 0 TECHNETIUM SESTAMIBI: Performed by: INTERNAL MEDICINE

## 2020-02-14 PROCEDURE — 78452 HT MUSCLE IMAGE SPECT MULT: CPT

## 2020-02-14 PROCEDURE — 93018 CV STRESS TEST I&R ONLY: CPT | Performed by: INTERNAL MEDICINE

## 2020-02-14 PROCEDURE — 93017 CV STRESS TEST TRACING ONLY: CPT

## 2020-02-14 RX ORDER — CAFFEINE CITRATE 20 MG/ML
60 SOLUTION INTRAVENOUS
Status: DISCONTINUED | OUTPATIENT
Start: 2020-02-14 | End: 2020-02-15 | Stop reason: HOSPADM

## 2020-02-14 RX ADMIN — REGADENOSON 0.4 MG: 0.08 INJECTION, SOLUTION INTRAVENOUS at 12:27

## 2020-02-14 RX ADMIN — CAFFEINE CITRATE 60 MG: 20 INJECTION, SOLUTION INTRAVENOUS at 12:46

## 2020-02-14 RX ADMIN — TECHNETIUM TC 99M SESTAMIBI 1 DOSE: 1 INJECTION INTRAVENOUS at 10:45

## 2020-02-14 RX ADMIN — TECHNETIUM TC 99M SESTAMIBI 1 DOSE: 1 INJECTION INTRAVENOUS at 12:25

## 2020-02-21 ENCOUNTER — TELEPHONE (OUTPATIENT)
Dept: CARDIOLOGY | Facility: CLINIC | Age: 80
End: 2020-02-21

## 2020-02-21 NOTE — TELEPHONE ENCOUNTER
----- Message from Parth Almazan MD sent at 2/20/2020  5:27 PM EST -----  Please call the patient and tell her that her stress test was normal.  would not make any changes at this time

## 2020-03-05 ENCOUNTER — TELEPHONE (OUTPATIENT)
Dept: CARDIOLOGY | Facility: CLINIC | Age: 80
End: 2020-03-05

## 2020-03-05 NOTE — TELEPHONE ENCOUNTER
Pt called with c/o feeling fatigue with no energy. Phone call was transferred to me from EP nurse.  She has had this complaint for awhile. All labs have been normal and device check was normal today via home monitor.   She had a normal stress test.  She has f/u appt with Tha next week with an ECHO.

## 2020-03-11 ENCOUNTER — OFFICE VISIT (OUTPATIENT)
Dept: CARDIOLOGY | Facility: CLINIC | Age: 80
End: 2020-03-11

## 2020-03-11 ENCOUNTER — HOSPITAL ENCOUNTER (OUTPATIENT)
Dept: CARDIOLOGY | Facility: HOSPITAL | Age: 80
Discharge: HOME | End: 2020-03-11
Admitting: INTERNAL MEDICINE

## 2020-03-11 ENCOUNTER — LAB REQUISITION (OUTPATIENT)
Dept: LAB | Facility: HOSPITAL | Age: 80
End: 2020-03-11

## 2020-03-11 VITALS
SYSTOLIC BLOOD PRESSURE: 132 MMHG | OXYGEN SATURATION: 97 % | HEART RATE: 70 BPM | WEIGHT: 164 LBS | BODY MASS INDEX: 29.06 KG/M2 | HEIGHT: 63 IN | DIASTOLIC BLOOD PRESSURE: 68 MMHG

## 2020-03-11 VITALS — HEIGHT: 63 IN | WEIGHT: 164 LBS | BODY MASS INDEX: 29.06 KG/M2

## 2020-03-11 DIAGNOSIS — R06.09 DOE (DYSPNEA ON EXERTION): Primary | ICD-10-CM

## 2020-03-11 DIAGNOSIS — I48.19 PERSISTENT ATRIAL FIBRILLATION (HCC): ICD-10-CM

## 2020-03-11 DIAGNOSIS — I10 ESSENTIAL HYPERTENSION: ICD-10-CM

## 2020-03-11 DIAGNOSIS — I42.0 CARDIOMYOPATHY, DILATED (HCC): Primary | ICD-10-CM

## 2020-03-11 DIAGNOSIS — Z00.00 ROUTINE GENERAL MEDICAL EXAMINATION AT A HEALTH CARE FACILITY: ICD-10-CM

## 2020-03-11 DIAGNOSIS — R06.09 DYSPNEA ON EXERTION: ICD-10-CM

## 2020-03-11 DIAGNOSIS — I49.5 TACHY-BRADY SYNDROME (HCC): ICD-10-CM

## 2020-03-11 DIAGNOSIS — I42.0 CARDIOMYOPATHY, DILATED (HCC): ICD-10-CM

## 2020-03-11 LAB
ALBUMIN SERPL-MCNC: 4.2 G/DL (ref 3.5–5.2)
ALBUMIN/GLOB SERPL: 1.5 G/DL
ALP SERPL-CCNC: 95 U/L (ref 39–117)
ALT SERPL W P-5'-P-CCNC: 9 U/L (ref 1–33)
ANION GAP SERPL CALCULATED.3IONS-SCNC: 11 MMOL/L (ref 5–15)
AST SERPL-CCNC: 14 U/L (ref 1–32)
BH CV ECHO MEAS - AO ROOT AREA (BSA CORRECTED): 1.4
BH CV ECHO MEAS - AO ROOT AREA: 5 CM^2
BH CV ECHO MEAS - AO ROOT DIAM: 2.5 CM
BH CV ECHO MEAS - BSA(HAYCOCK): 1.8 M^2
BH CV ECHO MEAS - BSA: 1.8 M^2
BH CV ECHO MEAS - BZI_BMI: 29.1 KILOGRAMS/M^2
BH CV ECHO MEAS - BZI_METRIC_HEIGHT: 160 CM
BH CV ECHO MEAS - BZI_METRIC_WEIGHT: 74.4 KG
BH CV ECHO MEAS - EDV(CUBED): 110.3 ML
BH CV ECHO MEAS - EDV(MOD-SP2): 56 ML
BH CV ECHO MEAS - EDV(MOD-SP4): 58 ML
BH CV ECHO MEAS - EDV(TEICH): 107.3 ML
BH CV ECHO MEAS - EF(CUBED): 63.6 %
BH CV ECHO MEAS - EF(MOD-SP2): 32.1 %
BH CV ECHO MEAS - EF(MOD-SP4): 48.3 %
BH CV ECHO MEAS - EF(TEICH): 55 %
BH CV ECHO MEAS - ESV(CUBED): 40.1 ML
BH CV ECHO MEAS - ESV(MOD-SP2): 38 ML
BH CV ECHO MEAS - ESV(MOD-SP4): 30 ML
BH CV ECHO MEAS - ESV(TEICH): 48.2 ML
BH CV ECHO MEAS - FS: 28.6 %
BH CV ECHO MEAS - IVS/LVPW: 0.9
BH CV ECHO MEAS - IVSD: 0.78 CM
BH CV ECHO MEAS - LA DIMENSION: 3.5 CM
BH CV ECHO MEAS - LA/AO: 1.4
BH CV ECHO MEAS - LAD MAJOR: 5.2 CM
BH CV ECHO MEAS - LV DIASTOLIC VOL/BSA (35-75): 32.6 ML/M^2
BH CV ECHO MEAS - LV MASS(C)D: 130.4 GRAMS
BH CV ECHO MEAS - LV MASS(C)DI: 73.4 GRAMS/M^2
BH CV ECHO MEAS - LV MAX PG: 4.9 MMHG
BH CV ECHO MEAS - LV MEAN PG: 2.7 MMHG
BH CV ECHO MEAS - LV SYSTOLIC VOL/BSA (12-30): 16.9 ML/M^2
BH CV ECHO MEAS - LV V1 MAX: 111.1 CM/SEC
BH CV ECHO MEAS - LV V1 MEAN: 76.4 CM/SEC
BH CV ECHO MEAS - LV V1 VTI: 19.6 CM
BH CV ECHO MEAS - LVIDD: 4.8 CM
BH CV ECHO MEAS - LVIDS: 3.4 CM
BH CV ECHO MEAS - LVLD AP2: 6.5 CM
BH CV ECHO MEAS - LVLD AP4: 6.2 CM
BH CV ECHO MEAS - LVLS AP2: 6.1 CM
BH CV ECHO MEAS - LVLS AP4: 6.1 CM
BH CV ECHO MEAS - LVOT AREA (M): 3.1 CM^2
BH CV ECHO MEAS - LVOT AREA: 3.1 CM^2
BH CV ECHO MEAS - LVOT DIAM: 2 CM
BH CV ECHO MEAS - LVPWD: 0.86 CM
BH CV ECHO MEAS - MR ALIAS VEL: 30.8 CM/SEC
BH CV ECHO MEAS - MR ERO: 0.06 CM^2
BH CV ECHO MEAS - MR FLOW RATE: 26.5 CM^3/SEC
BH CV ECHO MEAS - MR MAX PG: 68 MMHG
BH CV ECHO MEAS - MR MAX VEL: 411 CM/SEC
BH CV ECHO MEAS - MR MEAN PG: 46.8 MMHG
BH CV ECHO MEAS - MR MEAN VEL: 325.2 CM/SEC
BH CV ECHO MEAS - MR PISA RADIUS: 0.37 CM
BH CV ECHO MEAS - MR PISA: 0.86 CM^2
BH CV ECHO MEAS - MR VOLUME: 8.1 ML
BH CV ECHO MEAS - MR VTI: 126 CM
BH CV ECHO MEAS - MV AREA (1 DIAM): 6.7 CM^2
BH CV ECHO MEAS - MV DIAM: 2.9 CM
BH CV ECHO MEAS - MV FLOW AREA(1DIAM): 6.7 CM^2
BH CV ECHO MEAS - MV MAX PG: 5.1 MMHG
BH CV ECHO MEAS - MV MEAN PG: 1.8 MMHG
BH CV ECHO MEAS - MV V2 MAX: 113.2 CM/SEC
BH CV ECHO MEAS - MV V2 MEAN: 61.2 CM/SEC
BH CV ECHO MEAS - MV V2 VTI: 27.4 CM
BH CV ECHO MEAS - MVA(VTI): 2.2 CM^2
BH CV ECHO MEAS - RF(MV,LVOT)(1DIAM): 0.68
BH CV ECHO MEAS - SI(CUBED): 39.5 ML/M^2
BH CV ECHO MEAS - SI(LVOT): 33.8 ML/M^2
BH CV ECHO MEAS - SI(MOD-SP2): 10.1 ML/M^2
BH CV ECHO MEAS - SI(MOD-SP4): 15.8 ML/M^2
BH CV ECHO MEAS - SI(MV 1 DIAM): 103.9 ML/M^2
BH CV ECHO MEAS - SI(TEICH): 33.2 ML/M^2
BH CV ECHO MEAS - SV(CUBED): 70.1 ML
BH CV ECHO MEAS - SV(LVOT): 60 ML
BH CV ECHO MEAS - SV(MOD-SP2): 18 ML
BH CV ECHO MEAS - SV(MOD-SP4): 28 ML
BH CV ECHO MEAS - SV(MV 1 DIAM): 184.7 ML
BH CV ECHO MEAS - SV(TEICH): 59 ML
BH CV VAS BP LEFT ARM: NORMAL MMHG
BILIRUB SERPL-MCNC: 0.2 MG/DL (ref 0.2–1.2)
BUN BLD-MCNC: 26 MG/DL (ref 8–23)
BUN/CREAT SERPL: 19.5 (ref 7–25)
CALCIUM SPEC-SCNC: 9.7 MG/DL (ref 8.6–10.5)
CHLORIDE SERPL-SCNC: 104 MMOL/L (ref 98–107)
CO2 SERPL-SCNC: 30 MMOL/L (ref 22–29)
CREAT BLD-MCNC: 1.33 MG/DL (ref 0.57–1)
GFR SERPL CREATININE-BSD FRML MDRD: 38 ML/MIN/1.73
GLOBULIN UR ELPH-MCNC: 2.8 GM/DL
GLUCOSE BLD-MCNC: 133 MG/DL (ref 65–99)
LEFT ATRIUM VOLUME INDEX: 36.6 ML/M^2
LEFT ATRIUM VOLUME: 65 ML
MAXIMAL PREDICTED HEART RATE: 139 BPM
MR PISA EROA: 0.08 CM2
PISA ALIASING VEL: 3.1 M/S
PISA RADIUS: 0.4 CM
POTASSIUM BLD-SCNC: 4.4 MMOL/L (ref 3.5–5.2)
PROT SERPL-MCNC: 7 G/DL (ref 6–8.5)
SODIUM BLD-SCNC: 145 MMOL/L (ref 136–145)
STRESS TARGET HR: 118 BPM
T4 FREE SERPL-MCNC: 1.36 NG/DL (ref 0.93–1.7)
T4 SERPL-MCNC: 7.39 MCG/DL (ref 4.5–11.7)
TSH SERPL DL<=0.05 MIU/L-ACNC: 3.15 UIU/ML (ref 0.27–4.2)

## 2020-03-11 PROCEDURE — 84439 ASSAY OF FREE THYROXINE: CPT | Performed by: INTERNAL MEDICINE

## 2020-03-11 PROCEDURE — 93281 PM DEVICE PROGR EVAL MULTI: CPT | Performed by: INTERNAL MEDICINE

## 2020-03-11 PROCEDURE — 93306 TTE W/DOPPLER COMPLETE: CPT | Performed by: INTERNAL MEDICINE

## 2020-03-11 PROCEDURE — 84443 ASSAY THYROID STIM HORMONE: CPT | Performed by: INTERNAL MEDICINE

## 2020-03-11 PROCEDURE — 99214 OFFICE O/P EST MOD 30 MIN: CPT | Performed by: INTERNAL MEDICINE

## 2020-03-11 PROCEDURE — 80053 COMPREHEN METABOLIC PANEL: CPT | Performed by: INTERNAL MEDICINE

## 2020-03-11 PROCEDURE — 84436 ASSAY OF TOTAL THYROXINE: CPT | Performed by: INTERNAL MEDICINE

## 2020-03-11 PROCEDURE — 93306 TTE W/DOPPLER COMPLETE: CPT

## 2020-03-11 RX ORDER — DICYCLOMINE HCL 20 MG
20 TABLET ORAL DAILY
COMMUNITY
End: 2020-06-12

## 2020-03-11 NOTE — PROGRESS NOTES
Tania Schreiber  1940  [phone]      03/11/2020    [facility] CARDIOLOGY     Price, Joe Renteria MD  [address] [place] [id number]    Chief Complaint   Patient presents with   • Atrial Fibrillation   • Cardiomyopathy         Problem List:   1. Persistent atrial fibrillation/tachy-domo syndrome  a. CHADSVASc = 4 (age >75, female, HTN) on Eliquis  b. S/p ISABELA/ECV, 10/2016  c. ISABELA 10/27/16: EF 45-50%, mild to moderate MR, moderate TR  d. Previously treated with amiodarone, discontinued secondary to thyroid toxicity  e. Echocardiogram 4/12/17: EF 60-65%, no significant valvular disease  f. Echocardiogram 3/6/19: EF 56-60%, mild AS.   g. [employer] Chidi DDD pacemaker implant plus AV node ablation 3/26/2019  h. Initiated Flecainide 5/2019  i. Echocardiogram 7/2/2019 EF 41 to 45%  j. Flecainide discontinued due to drop in EF July 2019  k. Echocardiogram 8/29/19: EF 31-35%  l. S/p upgrade to Bi-V PPM, 8/30/19  m. Echo 1/3/2020 LVEF 55-60%  n. Stress Cardiolyte 2/14/2020 LVEF 63% No ischemia  2. Abnormal MPS:  a. MPS 10/24/16: EF 63%, medium, completely reversible, mid to basal-anteroseptal defect  b. Left heart catheterization 10/25/16: No significant coronary artery disease  3. Hypertension  4. Hyperlipidemia  5. Hypothyroidism  6. GERD  7. CRI  8. Surgical history:  a. Left knee surgery  b. Bilateral cataract surgery  c. Jaw tumor removal  d. Tonsillectomy  e. Cholecystectomy      Allergies  No Known Allergies    Current Medications    Current Outpatient Medications:   •  ALPRAZolam (XANAX) 0.5 MG tablet, Take 0.25-0.5 mg by mouth 2 (Two) Times a Day As Needed for Anxiety., Disp: , Rfl:   •  apixaban (ELIQUIS) 2.5 MG tablet tablet, Take 1 tablet by mouth Every 12 (Twelve) Hours., Disp: 60 tablet, Rfl: 5  •  atorvastatin (LIPITOR) 20 MG tablet, Take 20 mg by mouth Every Night., Disp: , Rfl:   •  carvedilol (COREG) 12.5 MG tablet, TAKE 1 TABLET BY MOUTH TWICE A DAY, Disp: 180 tablet, Rfl:  "1  •  dicyclomine (BENTYL) 20 MG tablet, Take 20 mg by mouth Daily., Disp: , Rfl:   •  doxazosin (CARDURA) 4 MG tablet, Take 1 tablet by mouth Every Night., Disp: 30 tablet, Rfl: 6  •  fluticasone (FLONASE) 50 MCG/ACT nasal spray, 2 sprays into the nostril(s) as directed by provider Daily As Needed for Rhinitis or Allergies., Disp: , Rfl:   •  furosemide (LASIX) 40 MG tablet, Take 1 tablet by mouth Every Morning., Disp: 90 tablet, Rfl: 3  •  lansoprazole (PREVACID) 30 MG capsule, Take 30 mg by mouth Every Morning., Disp: , Rfl:   •  levothyroxine (SYNTHROID, LEVOTHROID) 50 MCG tablet, Take 50 mcg by mouth Every Night., Disp: , Rfl:   •  Multiple Vitamins-Minerals (PRESERVISION AREDS PO), Take 2 tablets by mouth Every Morning., Disp: , Rfl:   •  Polyethylene Glycol 3350 (MIRALAX PO), Take 1 dose by mouth Every Night., Disp: , Rfl:     History of Present Illness     Pt presents for follow up of AF/DCM/CHF/HTN. Since the pt has seen us, pt denies any palpitations, CP, LH, and dizziness. Denies any hospitalizations, ER visits, ICD shocks, or TIA/CVA symptoms. Overall feels tired and fatigues with NARAYANAN unchanged since we last saw her. No side effects to medications. BP well controlled at home.     ROS:  General:  + fatigue, + weight gain  Cardiovascular:  Denies CP, PND, syncope, near syncope, edema or palpitations.  Pulmonary:  + NARAYANAN, No cough, or wheezing    Vitals:    03/11/20 1427   BP: 132/68   BP Location: Left arm   Patient Position: Sitting   Pulse: 70   SpO2: 97%   Weight: 74.4 kg (164 lb)   Height: 160 cm (63\")       PE:  General: NAD  Neck: no JVD, no carotid bruits, no TM  Heart RRR, NL S1, S2, S4 present, no rubs, murmurs  Lungs: CTA, no wheezes, rhonchi, or rales  Abd: soft, non-tender, NL BS  Ext: No musculoskeletal deformities, no edema, cyanosis, or clubbing  Psych: normal mood and affect    Diagnostic Data:  Procedures.    1. NARAYANAN (dyspnea on exertion)    2. Persistent atrial fibrillation    3. " Cardiomyopathy, dilated (CMS/HCC)    4. Essential hypertension        ICD interrogation: NL fxn, NL battery fxn, CAF, 99% BiV paced    Plan:  1)  NARAYANAN: unchanged despite upgrade to BiV pacing: LVEF back to NL by echo and Cardiolyte: needs pulmonary consult due to second hand smoke exposure and cardiac rehab: will repeat TSH, T4, free T4   2) Persistent atrial fibrillation:  - Now chronic in nature.  S/p AVN ablation.    - Continue present medications.   3) Anticoagulation:  - CHADSVASc = 4, on Eliquis    4) HTN:  - Well controlled on Cardura  - Wt loss, exercise, salt reduction    F/up in 6 months

## 2020-03-13 ENCOUNTER — DOCUMENTATION (OUTPATIENT)
Dept: OTHER | Facility: OTHER | Age: 80
End: 2020-03-13

## 2020-03-13 NOTE — RESEARCH
Called patient at home to advise her she would be withdrawn from MORE CRT MPP study secondary to pacing configuration limitations from diaphragm stim.

## 2020-03-17 ENCOUNTER — DOCUMENTATION (OUTPATIENT)
Dept: CARDIAC REHAB | Facility: HOSPITAL | Age: 80
End: 2020-03-17

## 2020-04-16 LAB
ALBUMIN SERPL-MCNC: 4.2 G/DL (ref 3.5–5.2)
ALBUMIN/GLOB SERPL: 1.5 G/DL
ALP SERPL-CCNC: 95 U/L (ref 39–117)
ALT SERPL-CCNC: 9 U/L (ref 1–33)
AST SERPL-CCNC: 14 U/L (ref 1–32)
BILIRUB SERPL-MCNC: 0.2 MG/DL (ref 0.2–1.2)
BUN SERPL-MCNC: 26 MG/DL (ref 8–23)
BUN/CREAT SERPL: 19.5 (ref 7–25)
CALCIUM SERPL-MCNC: 9.7 MG/DL (ref 8.6–10.5)
CHLORIDE SERPL-SCNC: 104 MMOL/L (ref 98–107)
CO2 SERPL-SCNC: 30 MMOL/L (ref 22–29)
CREAT SERPL-MCNC: 1.33 MG/DL (ref 0.57–1)
GLOBULIN SER CALC-MCNC: 2.8 GM/DL
GLUCOSE SERPL-MCNC: 133 MG/DL (ref 65–99)
POTASSIUM SERPL-SCNC: 4.4 MMOL/L (ref 3.5–5.2)
PROT SERPL-MCNC: 7 G/DL (ref 6–8.5)
SODIUM SERPL-SCNC: 145 MMOL/L (ref 136–145)
T4 FREE SERPL-MCNC: NORMAL NG/DL
T4 SERPL-MCNC: 7.39 MCG/DL (ref 4.5–11.7)
TSH SERPL DL<=0.005 MIU/L-ACNC: 3.15 MIU/ML (ref 0.27–4.2)

## 2020-06-04 ENCOUNTER — TELEPHONE (OUTPATIENT)
Dept: CARDIOLOGY | Facility: CLINIC | Age: 80
End: 2020-06-04

## 2020-06-04 NOTE — TELEPHONE ENCOUNTER
I received a call from [facility] that Dr. Graves received a call last night from ER dr at [facility]. Pt was there for Epistaxis. While there they tried to get a EKG and was unable. ER doctor is concerned that PPM is not working correctly. I called pt and she is feeling weak she says it comes and goes. She was unable to put her groceries up yesterday. I had her send in a device check and Soraya Garcia has checked I was normal. [facility] already has referral sent for cardiac rehab and pulmonology. Pt is aware of the report and the referrals.

## 2020-06-12 ENCOUNTER — OFFICE VISIT (OUTPATIENT)
Dept: CARDIOLOGY | Facility: CLINIC | Age: 80
End: 2020-06-12

## 2020-06-12 VITALS
WEIGHT: 161 LBS | HEIGHT: 63 IN | HEART RATE: 72 BPM | DIASTOLIC BLOOD PRESSURE: 82 MMHG | BODY MASS INDEX: 28.53 KG/M2 | SYSTOLIC BLOOD PRESSURE: 140 MMHG

## 2020-06-12 DIAGNOSIS — R06.02 SOB (SHORTNESS OF BREATH): Primary | ICD-10-CM

## 2020-06-12 PROCEDURE — 93281 PM DEVICE PROGR EVAL MULTI: CPT | Performed by: PHYSICIAN ASSISTANT

## 2020-06-12 PROCEDURE — 99213 OFFICE O/P EST LOW 20 MIN: CPT | Performed by: PHYSICIAN ASSISTANT

## 2020-06-12 RX ORDER — AMLODIPINE BESYLATE 2.5 MG/1
2.5 TABLET ORAL DAILY
COMMUNITY
End: 2021-06-23 | Stop reason: DRUGHIGH

## 2020-06-12 NOTE — PROGRESS NOTES
"[facility] at [facility]   OFFICE NOTE      Tania Schreiber  1940  PCP: Manda Nino MD    SUBJECTIVE:   Tania Schreiber is a 79 y.o. female seen for a follow up visit regarding the following:     CC:Afib    HPI:   Pleasant 79-year-old female seen in follow-up regarding fibrillation fibrillation, hypertension hyperlipidemia tachybradycardia syndrome and Saint Chidi BIVPPM.  Patient reports that she still has episodes where she becomes anxious and worried and she begins to breathe heavy.  She is also noted her blood pressure was somewhat elevated when she was with her PCP to restart new medication amlodipine.  She denies that she has any orthopnea PND worsening peripheral edema.  She does not wear herself on a regular basis.  She denies any chest pain.  She feels she is \"going downhill\" and is worried a lot takes her blood pressure on a regular basis.    Cardiac PMH: (Old records have been reviewed and summarized below)  1. Persistent atrial fibrillation/tachy-domo syndrome  a. CHADSVASc = 4 (age >75, female, HTN) on Eliquis  b. S/p ISABELA/ECV, 10/2016  c. ISABELA 10/27/16: EF 45-50%, mild to moderate MR, moderate TR  d. Previously treated with amiodarone, discontinued secondary to thyroid toxicity  e. Echocardiogram 4/12/17: EF 60-65%, no significant valvular disease  f. Echocardiogram 3/6/19: EF 56-60%, mild AS.   g. St Chidi DDD pacemaker implant plus AV node ablation 3/26/2019  h. Initiated Flecainide 5/2019  i. Echocardiogram 7/2/2019 EF 41 to 45%  j. Flecainide discontinued due to drop in EF July 2019  k. Echocardiogram 8/29/19: EF 31-35%  l. S/p upgrade to Bi-V PPM, 8/30/19  m. Echo 1/3/2020 LVEF 55-60%  n. Stress Cardiolyte 2/14/2020 LVEF 63% No ischemia  2. Abnormal MPS:  a. MPS 10/24/16: EF 63%, medium, completely reversible, mid to basal-anteroseptal defect  b. Left heart catheterization 10/25/16: No significant coronary artery disease  c. Repeat stress test February 2020 normal " LV function no active ischemia.  3. Hypertension  4. Hyperlipidemia  5. Hypothyroidism  6. GERD  7. CRI  8. Surgical history:  a. Left knee surgery  b. Bilateral cataract surgery  c. Jaw tumor removal  d. Tonsillectomy  e. Cholecystectomy       Past Medical History, Past Surgical History, Family history, Social History, and Medications were all reviewed with the patient today and updated as necessary.       Current Outpatient Medications:   •  ALPRAZolam (XANAX) 0.5 MG tablet, Take 0.25-0.5 mg by mouth 2 (Two) Times a Day As Needed for Anxiety., Disp: , Rfl:   •  amLODIPine (NORVASC) 2.5 MG tablet, Take 2.5 mg by mouth Daily., Disp: , Rfl:   •  apixaban (ELIQUIS) 2.5 MG tablet tablet, Take 1 tablet by mouth Every 12 (Twelve) Hours., Disp: 60 tablet, Rfl: 5  •  atorvastatin (LIPITOR) 20 MG tablet, Take 20 mg by mouth Every Night., Disp: , Rfl:   •  carvedilol (COREG) 12.5 MG tablet, TAKE 1 TABLET BY MOUTH TWICE A DAY, Disp: 180 tablet, Rfl: 1  •  doxazosin (CARDURA) 4 MG tablet, Take 1 tablet by mouth Every Night., Disp: 30 tablet, Rfl: 6  •  fluticasone (FLONASE) 50 MCG/ACT nasal spray, 2 sprays into the nostril(s) as directed by provider Daily As Needed for Rhinitis or Allergies., Disp: , Rfl:   •  furosemide (LASIX) 40 MG tablet, Take 1 tablet by mouth Every Morning., Disp: 90 tablet, Rfl: 3  •  lansoprazole (PREVACID) 30 MG capsule, Take 30 mg by mouth Every Morning., Disp: , Rfl:   •  levothyroxine (SYNTHROID, LEVOTHROID) 50 MCG tablet, Take 50 mcg by mouth Every Night., Disp: , Rfl:   •  Multiple Vitamins-Minerals (PRESERVISION AREDS PO), Take 2 tablets by mouth Every Morning., Disp: , Rfl:   •  Polyethylene Glycol 3350 (MIRALAX PO), Take 1 dose by mouth Every Night., Disp: , Rfl:       No Known Allergies  Patient Active Problem List   Diagnosis   • Persistent atrial fibrillation (CMS/HCC)   • Essential hypertension   • Hyperlipidemia   • Tachy-domo syndrome (CMS/HCC)   • Cardiomyopathy, dilated (CMS/HCC)   •  Chest pain     Past Medical History:   Diagnosis Date   • Anxiety    • Arthritis     right knee    • Atrial fibrillation (CMS/HCC)    • CHF (congestive heart failure) (CMS/HCC)    • Disease of thyroid gland    • GERD (gastroesophageal reflux disease)    • Heart murmur    • Hyperlipidemia    • Hypertension    • Macular degeneration    • Melanoma (CMS/HCC)     right toe    • Wears glasses      Past Surgical History:   Procedure Laterality Date   • CARDIAC CATHETERIZATION  2016   • CARDIAC ELECTROPHYSIOLOGY PROCEDURE N/A 3/26/2019    Procedure: VVI PPM Implant (w/AVN RFA), on Eliquis DNS;  Surgeon: Parth Almazan MD;  Location:  [facility];  Service: Cardiology   • CARDIAC ELECTROPHYSIOLOGY PROCEDURE N/A 3/26/2019    Procedure: AV node ablation (w/PPM Implant), on Eliquis DNS;  Surgeon: Parth Almazan MD;  Location:  [facility];  Service: Cardiovascular   • CARDIAC ELECTROPHYSIOLOGY PROCEDURE N/A 8/30/2019    Procedure: Upgrade to BIV pacemaker in 3 weeks. DNS meds.;  Surgeon: Parth Almazan MD;  Location:  [facility];  Service: Cardiology   • CARDIOVERSION      x2    • CHOLECYSTECTOMY     • COLONOSCOPY     • ENDOSCOPY     • EYE SURGERY      cataracts   • INSERT / REPLACE / REMOVE PACEMAKER     • MEDIAL COLLATERAL LIGAMENT REPAIR, KNEE Right    • SKIN BIOPSY      melanoma on right toe    • TONSILLECTOMY       History reviewed. No pertinent family history.  Social History     Tobacco Use   • Smoking status: Never Smoker   • Smokeless tobacco: Never Used   Substance Use Topics   • Alcohol use: No     Frequency: Never       ROS:  Review of Symptoms:  General: no recent weight loss/gain, weakness or fatigue  Skin: no rashes, lumps, or other skin changes  HEENT: no dizziness, lightheadedness, or vision changes  Respiratory: + SOB  Cardiovascular: no palpitations, and tachycardia  Gastrointestinal: no black/tarry stools or diarrhea  Urinary: no change in frequency  "or urgency  Peripheral Vascular: no claudication or leg cramps  Musculoskeletal: no muscle or joint pain/stiffness  Psychiatric: no depression or excessive stress  Neurological: no sensory or motor loss, no syncope  Hematologic: no anemia, easy bruising or bleeding  Endocrine: no thyroid problems, nor heat or cold intolerance    PHYSICAL EXAM:    /82 (BP Location: Right arm, Patient Position: Sitting)   Pulse 72   Ht 160 cm (63\")   Wt 73 kg (161 lb)   BMI 28.52 kg/m²        Wt Readings from Last 5 Encounters:   06/12/20 73 kg (161 lb)   03/11/20 74.4 kg (164 lb)   03/11/20 74.4 kg (164 lb)   02/14/20 74.4 kg (164 lb)   01/03/20 72.1 kg (159 lb)       BP Readings from Last 5 Encounters:   06/12/20 140/82   03/11/20 132/68   02/14/20 142/90   12/18/19 124/80   08/31/19 114/73       General appearance - Alert, well appearing, and in no distress   Mental status - Affect appropriate to mood.  Eyes - Sclerae anicteric,  ENMT - Hearing grossly normal bilaterally, Dental hygiene good.  Neck - Carotids upstroke normal bilaterally, no bruits, no JVD.  Resp - Clear to auscultation, no wheezes, rales or rhonchi, symmetric air entry.  Heart - Normal rate, regular rhythm, normal S1, S2, no murmurs, rubs, clicks or gallops.  GI - Soft, nontender, nondistended, no masses or organomegaly.  Neurological - Grossly intact - normal speech, no focal findings  Musculoskeletal - No joint tenderness, deformity or swelling, no muscular tenderness noted.  Extremities - Peripheral pulses normal, no pedal edema, no clubbing or cyanosis.  Skin - Normal coloration and turgor.  Psych -  oriented to person, place, and time.    Medical problems and test results were reviewed with the patient today.           Device Interrogation:  Please see device interrogation form which has been signed and updated for full details.  Saint Chidi biventricular pacemaker.  DDDR 70.  RV LV paced 9 9%.  Acceptable sensing thresholds.  Previous AV node " ablation.    ASSESSMENT   1. [place] Afib: Remote AVN RFA 2019.   2. Anticoagulation: Chadsvasc=4, Eliquis  3. HTN: Recently increased, Norvasc added per PCP this week  4. [place] GERD, Gastritis: on Prevacid, Sucralfate   5. NICM: Negative LHC 2016, MPS 2020 for ischemia.   6. BIVICD:  Normal function.   7. Anxiety       PLAN  · Continue current medical therapy  · Patient continues to have some shortness of breath Dr. Almazan had recommended previously evaluation pulmonology.  · Symptoms patient symptoms are also related to anxiety consider SSRI per PCP.  · Return follow-up with Dr. Almazan in 6 months or sooner as needed.        6/12/2020  15:48    Will Latoya GARDNER

## 2020-06-22 RX ORDER — APIXABAN 2.5 MG/1
TABLET, FILM COATED ORAL
Qty: 60 TABLET | Refills: 6 | Status: SHIPPED | OUTPATIENT
Start: 2020-06-22 | End: 2021-01-27 | Stop reason: SDUPTHER

## 2020-07-09 RX ORDER — DOXAZOSIN MESYLATE 4 MG/1
TABLET ORAL
Qty: 90 TABLET | Refills: 2 | Status: SHIPPED | OUTPATIENT
Start: 2020-07-09 | End: 2020-11-11 | Stop reason: SDUPTHER

## 2020-08-13 RX ORDER — CARVEDILOL 12.5 MG/1
TABLET ORAL
Qty: 180 TABLET | Refills: 1 | Status: SHIPPED | OUTPATIENT
Start: 2020-08-13 | End: 2020-11-11 | Stop reason: SDUPTHER

## 2020-08-28 RX ORDER — FUROSEMIDE 40 MG/1
TABLET ORAL
Qty: 90 TABLET | Refills: 0 | Status: SHIPPED | OUTPATIENT
Start: 2020-08-28 | End: 2020-11-11 | Stop reason: SDUPTHER

## 2020-09-25 ENCOUNTER — TELEPHONE (OUTPATIENT)
Dept: CARDIOLOGY | Facility: CLINIC | Age: 80
End: 2020-09-25

## 2020-11-11 ENCOUNTER — OFFICE VISIT (OUTPATIENT)
Dept: CARDIOLOGY | Facility: CLINIC | Age: 80
End: 2020-11-11

## 2020-11-11 VITALS
SYSTOLIC BLOOD PRESSURE: 146 MMHG | BODY MASS INDEX: 28.35 KG/M2 | HEIGHT: 63 IN | HEART RATE: 72 BPM | OXYGEN SATURATION: 95 % | DIASTOLIC BLOOD PRESSURE: 80 MMHG | WEIGHT: 160 LBS

## 2020-11-11 DIAGNOSIS — R53.82 CHRONIC FATIGUE: Primary | ICD-10-CM

## 2020-11-11 DIAGNOSIS — I10 ESSENTIAL HYPERTENSION: ICD-10-CM

## 2020-11-11 DIAGNOSIS — I42.0 CARDIOMYOPATHY, DILATED (HCC): ICD-10-CM

## 2020-11-11 PROBLEM — I48.21 PERMANENT ATRIAL FIBRILLATION (HCC): Status: ACTIVE | Noted: 2019-03-06

## 2020-11-11 PROCEDURE — 99213 OFFICE O/P EST LOW 20 MIN: CPT | Performed by: PHYSICIAN ASSISTANT

## 2020-11-11 PROCEDURE — 93281 PM DEVICE PROGR EVAL MULTI: CPT | Performed by: PHYSICIAN ASSISTANT

## 2020-11-11 RX ORDER — DOXAZOSIN MESYLATE 4 MG/1
4 TABLET ORAL NIGHTLY
Qty: 90 TABLET | Refills: 2 | Status: SHIPPED | OUTPATIENT
Start: 2020-11-11

## 2020-11-11 RX ORDER — SUCRALFATE 1 G/1
1 TABLET ORAL 3 TIMES DAILY PRN
COMMUNITY

## 2020-11-11 RX ORDER — CARVEDILOL 6.25 MG/1
6.25 TABLET ORAL 2 TIMES DAILY
Qty: 60 TABLET | Refills: 6 | Status: SHIPPED | OUTPATIENT
Start: 2020-11-11 | End: 2021-06-04

## 2020-11-11 RX ORDER — FUROSEMIDE 40 MG/1
40 TABLET ORAL EVERY MORNING
Qty: 90 TABLET | Refills: 0 | Status: SHIPPED | OUTPATIENT
Start: 2020-11-11

## 2020-11-11 NOTE — PROGRESS NOTES
Tania Schreiber  1940  [phone]    11/11/2020    [facility] CARDIOLOGY     Manda Nino MD  [address] [facility] [address]    Chief Complaint   Patient presents with   • Shortness of Breath       Problem List:   1. Persistent atrial fibrillation/tachy-domo syndrome  a. CHADSVASc = 4 (age >75, female, HTN) on Eliquis  b. S/p ISABELA/ECV, 10/2016  c. ISABELA 10/27/16: EF 45-50%, mild to moderate MR, moderate TR  d. Previously treated with amiodarone, discontinued secondary to thyroid toxicity  e. Echocardiogram 4/12/17: EF 60-65%, no significant valvular disease  f. Echocardiogram 3/6/19: EF 56-60%, mild AS.   g. St Chidi DDD pacemaker implant plus AV node ablation 3/26/2019  h. Initiated Flecainide 5/2019  i. Echocardiogram 7/2/2019 EF 41 to 45%  j. Flecainide discontinued due to drop in EF July 2019  k. Echocardiogram 8/29/19: EF 31-35%  l. S/p upgrade to Bi-V PPM, 8/30/19  m. Echo 1/3/2020 LVEF 55-60%  n. Stress Cardiolyte 2/14/2020 LVEF 63% No ischemia  2. Abnormal MPS:  a. MPS 10/24/16: EF 63%, medium, completely reversible, mid to basal-anteroseptal defect  b. Left heart catheterization 10/25/16: No significant coronary artery disease  c. Repeat stress test February 2020 normal LV function no active ischemia.  3. Hypertension  4. Hyperlipidemia  5. Hypothyroidism  6. GERD  7. CRI  8. Surgical history:  a. Left knee surgery  b. Bilateral cataract surgery  c. Jaw tumor removal  d. Tonsillectomy  e. Cholecystectomy    Allergies  No Known Allergies    Current Medications    Current Outpatient Medications:   •  ALPRAZolam (XANAX) 0.5 MG tablet, Take 0.25-0.5 mg by mouth 2 (Two) Times a Day As Needed for Anxiety., Disp: , Rfl:   •  amLODIPine (NORVASC) 2.5 MG tablet, Take 2.5 mg by mouth Daily., Disp: , Rfl:   •  atorvastatin (LIPITOR) 20 MG tablet, Take 20 mg by mouth Every Night., Disp: , Rfl:   •  carvedilol (COREG) 12.5 MG tablet, TAKE 1 TABLET BY MOUTH TWICE A DAY, Disp: 180  tablet, Rfl: 1  •  doxazosin (CARDURA) 4 MG tablet, TAKE 1 TABLET BY MOUTH EVERY DAY AT NIGHT *STOP FLECAINIDE*, Disp: 90 tablet, Rfl: 2  •  ELIQUIS 2.5 MG tablet tablet, TAKE 1 TABLET BY MOUTH EVERY 12 HOURS, Disp: 60 tablet, Rfl: 6  •  fluticasone (FLONASE) 50 MCG/ACT nasal spray, 2 sprays into the nostril(s) as directed by provider Daily As Needed for Rhinitis or Allergies., Disp: , Rfl:   •  furosemide (LASIX) 40 MG tablet, TAKE 1 TABLET BY MOUTH EVERY DAY IN THE MORNING, Disp: 90 tablet, Rfl: 0  •  lansoprazole (PREVACID) 30 MG capsule, Take 30 mg by mouth Every Morning., Disp: , Rfl:   •  levothyroxine (SYNTHROID, LEVOTHROID) 50 MCG tablet, Take 50 mcg by mouth Every Night., Disp: , Rfl:   •  Multiple Vitamins-Minerals (PRESERVISION AREDS PO), Take 2 tablets by mouth Every Morning., Disp: , Rfl:   •  Polyethylene Glycol 3350 (MIRALAX PO), Take 1 dose by mouth Every Night., Disp: , Rfl:   •  sucralfate (CARAFATE) 1 g tablet, Take 1 g by mouth 3 (Three) Times a Day As Needed., Disp: , Rfl:     History of Present Illness     Pt presents for follow up of atrial fibrillation, DCM with BiV PM, chronic NARAYANAN. Since we last saw the pt, she had pulmonary function test with PCP which she states was normal. She did not see pulmonology. Despite normalization of her EF with BiV pacing, she still feels some NARAYANAN with exertion, but her main complaint is severe fatigue. She is going to have knee surgery on 11/23/2020. She denies CP, syncope. She has some mild dizziness in the mornings. BPs at home are in the 110-120s systolic. No recent hospitalizations or ER visits. She is tolerating Eliquis fine, without bleeding issues. She has had a couple of nosebleeds but none recently. No CVA like symptoms.     ROS:  General:  + fatigue, - weight gain or loss  Cardiovascular:  Denies CP, PND, syncope, near syncope, edema or palpitations.  Pulmonary:  + NARAYANAN, - cough, or wheezing      Vitals:    11/11/20 1040   BP: 146/80   BP Location:  "Right arm   Patient Position: Sitting   Cuff Size: Adult   Pulse: 72   SpO2: 95%   Weight: 72.6 kg (160 lb)   Height: 160 cm (63\")     Body mass index is 28.34 kg/m².  PE:  General: NAD. A & O x 3   Neck: no JVD, no carotid bruits, no TM  Heart RRR, NL S1, S2, , no rubs, + murmurs  Lungs: CTA, no wheezes, rhonchi, or rales  Abd: soft, non-tender, NL BS  Ext: No musculoskeletal deformities, + slight ankle  edema, - cyanosis, or clubbing  Psych: normal mood and affect    Diagnostic Data:    BiV PM Manual Interrogation: normal function. 100% BiV Paced. 4.2 years on battery. Changed sensor threshold for better rate response    Procedures    1. Chronic fatigue    2. Cardiomyopathy, dilated (CMS/HCC)    3. Essential hypertension          Plan:    1. Fatigue:  - patient with persistent fatigue despite normalization of EF. Had remote negative sleep study per patient report. Had normal PFTs recently with Dr. Augustin and normal blood work.   - decrease Coreg to 6.25 mg BID to see if this helps. Also changed sensor for better rate response on her device   - may need repeat sleep study     2. Permanent Atrial Fibrillation:   - s/p AVN ablation in the past  - normal BiV function    3. Dilated Cardiomyopathy:  - BiV PM check WNL today   - normalized EF and normal stress test 2/2020    4. HTN:  - well controlled    Patient is cleared for knee surgery and should Eliquis x 48 hours prior to knee surgery.     F/up in 6 months    Electronically signed by BECCA Youngblood, 11/11/20, 11:27 AM EST.    "

## 2021-02-02 RX ORDER — CARVEDILOL 12.5 MG/1
TABLET ORAL
Qty: 180 TABLET | Refills: 1 | OUTPATIENT
Start: 2021-02-02

## 2021-03-14 PROCEDURE — 93296 REM INTERROG EVL PM/IDS: CPT | Performed by: INTERNAL MEDICINE

## 2021-03-14 PROCEDURE — 93294 REM INTERROG EVL PM/LDLS PM: CPT | Performed by: INTERNAL MEDICINE

## 2021-06-04 RX ORDER — CARVEDILOL 6.25 MG/1
TABLET ORAL
Qty: 180 TABLET | Refills: 3 | Status: SHIPPED | OUTPATIENT
Start: 2021-06-04

## 2021-06-13 PROCEDURE — 93296 REM INTERROG EVL PM/IDS: CPT | Performed by: INTERNAL MEDICINE

## 2021-06-13 PROCEDURE — 93294 REM INTERROG EVL PM/LDLS PM: CPT | Performed by: INTERNAL MEDICINE

## 2021-06-23 ENCOUNTER — OFFICE VISIT (OUTPATIENT)
Dept: CARDIOLOGY | Facility: CLINIC | Age: 81
End: 2021-06-23

## 2021-06-23 VITALS
SYSTOLIC BLOOD PRESSURE: 134 MMHG | WEIGHT: 156 LBS | DIASTOLIC BLOOD PRESSURE: 68 MMHG | HEIGHT: 63 IN | BODY MASS INDEX: 27.64 KG/M2 | OXYGEN SATURATION: 98 % | HEART RATE: 70 BPM

## 2021-06-23 DIAGNOSIS — I10 ESSENTIAL HYPERTENSION: ICD-10-CM

## 2021-06-23 DIAGNOSIS — R53.82 CHRONIC FATIGUE: ICD-10-CM

## 2021-06-23 DIAGNOSIS — I42.0 CARDIOMYOPATHY, DILATED (HCC): ICD-10-CM

## 2021-06-23 DIAGNOSIS — I48.21 PERMANENT ATRIAL FIBRILLATION (HCC): Primary | ICD-10-CM

## 2021-06-23 PROBLEM — R53.83 FATIGUE: Status: ACTIVE | Noted: 2021-06-23

## 2021-06-23 PROCEDURE — 99214 OFFICE O/P EST MOD 30 MIN: CPT | Performed by: NURSE PRACTITIONER

## 2021-06-23 RX ORDER — SERTRALINE HYDROCHLORIDE 25 MG/1
25 TABLET, FILM COATED ORAL DAILY
COMMUNITY
Start: 2021-05-03

## 2021-06-23 RX ORDER — AMLODIPINE BESYLATE 5 MG/1
5 TABLET ORAL DAILY
COMMUNITY

## 2021-06-23 RX ORDER — CYCLOSPORINE 0.5 MG/ML
EMULSION OPHTHALMIC
COMMUNITY
Start: 2021-04-26

## 2021-06-23 NOTE — PROGRESS NOTES
Tania Schreiber  1940  [phone]    06/23/2021    [facility] CARDIOLOGY     Referring Provider: No ref. provider found     Manda Nino MD  [address]  [facility] [address]    Chief Complaint   Patient presents with   • Atrial Fibrillation       Problem List:     1. Persistent atrial fibrillation/tachy-domo syndrome  a. CHADSVASc = 4 (age >75, female, HTN) on Eliquis  b. S/p ISABELA/ECV, 10/2016  c. ISABELA 10/27/16: EF 45-50%, mild to moderate MR, moderate TR  d. Previously treated with amiodarone, discontinued secondary to thyroid toxicity  e. Echocardiogram 4/12/17: EF 60-65%, no significant valvular disease  f. Echocardiogram 3/6/19: EF 56-60%, mild AS.   g. St Chidi DDD pacemaker implant plus AV node ablation 3/26/2019  h. Initiated Flecainide 5/2019  i. Echocardiogram 7/2/2019 EF 41 to 45%  j. Flecainide discontinued due to drop in EF July 2019  k. Echocardiogram 8/29/19: EF 31-35%  l. S/p upgrade to Bi-V PPM, 8/30/19  m. Echo 1/3/2020 LVEF 55-60%  n. Stress Cardiolyte 2/14/2020 LVEF 63% No ischemia  2. Abnormal MPS:  a. MPS 10/24/16: EF 63%, medium, completely reversible, mid to basal-anteroseptal defect  b. Left heart catheterization 10/25/16: No significant coronary artery disease  c. Repeat stress test February 2020 normal LV function no active ischemia.  3. Hypertension  4. Hyperlipidemia  5. Hypothyroidism  6. GERD  7. CRI  8. Surgical history:  a. Left knee surgery  b. Bilateral cataract surgery  c. Jaw tumor removal  d. Tonsillectomy  e. Cholecystectomy       Allergies  No Known Allergies    Current Medications    Current Outpatient Medications:   •  ALPRAZolam (XANAX) 0.5 MG tablet, Take 0.25-0.5 mg by mouth 2 (Two) Times a Day As Needed for Anxiety., Disp: , Rfl:   •  amLODIPine (NORVASC) 5 MG tablet, Take 5 mg by mouth Daily., Disp: , Rfl:   •  apixaban (Eliquis) 2.5 MG tablet tablet, Take 1 tablet by mouth Every 12 (Twelve) Hours., Disp: 60 tablet, Rfl: 11  •  atorvastatin  (LIPITOR) 20 MG tablet, Take 20 mg by mouth Every Night., Disp: , Rfl:   •  carvedilol (COREG) 6.25 MG tablet, TAKE 1 TABLET BY MOUTH TWICE A DAY, Disp: 180 tablet, Rfl: 3  •  Cyanocobalamin (B-12 PO), Take 1 tablet by mouth Daily., Disp: , Rfl:   •  doxazosin (CARDURA) 4 MG tablet, Take 1 tablet by mouth Every Night., Disp: 90 tablet, Rfl: 2  •  Doxylamine Succinate, Sleep, (SLEEP AID PO), Take 1 tablet by mouth As Needed., Disp: , Rfl:   •  fluticasone (FLONASE) 50 MCG/ACT nasal spray, 2 sprays into the nostril(s) as directed by provider Daily As Needed for Rhinitis or Allergies., Disp: , Rfl:   •  furosemide (LASIX) 40 MG tablet, Take 1 tablet by mouth Every Morning., Disp: 90 tablet, Rfl: 0  •  lansoprazole (PREVACID) 30 MG capsule, Take 30 mg by mouth Every Morning., Disp: , Rfl:   •  levothyroxine (SYNTHROID, LEVOTHROID) 50 MCG tablet, Take 50 mcg by mouth Every Night., Disp: , Rfl:   •  Multiple Vitamins-Minerals (PRESERVISION AREDS PO), Take 2 tablets by mouth Every Morning., Disp: , Rfl:   •  Polyethylene Glycol 3350 (MIRALAX PO), Take 1 dose by mouth As Needed (constipation)., Disp: , Rfl:   •  Restasis 0.05 % ophthalmic emulsion, INSTILL 1 DROP INTO BOTH EYES TWICE A DAY, Disp: , Rfl:   •  sertraline (ZOLOFT) 25 MG tablet, Take 25 mg by mouth Daily., Disp: , Rfl:   •  sucralfate (CARAFATE) 1 g tablet, Take 1 g by mouth 3 (Three) Times a Day As Needed., Disp: , Rfl:     History of Present Illness     Pt presents for follow up of AF, DCM with BiV PM, chronic NARAYANAN. Since we last saw the pt, pt denies any palps, CP, LH, and dizziness. She does continue to have some NARAYANAN, this is a chronic finding. She does continue to have moderate fatigue, though it has improved since lowered her coreg last year. Denies any bleeding issues on Eliquis or TIA/CVA symptoms. She did have right knee surgery with Dr. Martins in [place] in November 2020 and did well with the surgery. BP well controlled. Overall feels fatigued.  "    ROS:  General:  + fatigue, -weight gain or loss  Cardiovascular:  Denies CP, PND, syncope, near syncope, edema or palpitations.  Pulmonary:  + NARAYANAN,- cough, or wheezing      Vitals:    06/23/21 1331   BP: 134/68   BP Location: Left arm   Patient Position: Sitting   Pulse: 70   SpO2: 98%   Weight: 70.8 kg (156 lb)   Height: 160 cm (63\")     Body mass index is 27.63 kg/m².  PE:  General: NAD  Neck: no JVD, no carotid bruits, no TM  Heart  irr irr, no rubs, murmurs  Lungs: CTA, no wheezes, rhonchi, or rales  Abd: soft, non-tender, NL BS  Ext: No musculoskeletal deformities, no edema, cyanosis, or clubbing  Psych: normal mood and affect    Diagnostic Data:  Manual Device Interrogation: SJM, BiV ICD, DDDR,  bpm, RA paced <1%, RV >99%, AF, no events, 3.5 years on battery.     Procedures    1. Permanent atrial fibrillation (CMS/HCC)    2. Cardiomyopathy, dilated (CMS/HCC)    3. Essential hypertension    4. Chronic fatigue        Plan:    1. Fatigue:  - patient with persistent fatigue despite normalization of EF. Had remote negative sleep study per patient report >20 years ago. Had normal PFTs in 2020 with Dr. Augustin.    -Reviewed labs today from Feb 2021, all were normal including thyroid and cbc   -Has improved with decreasing Coreg to 6.25 mg BID   - Recommended repeat sleep study as it has been >20 years. Patient is going to consider and let us know.      2. Permanent Atrial Fibrillation:   - s/p AVN ablation in the past  - normal BiV function     3. Dilated Cardiomyopathy:  - BiV PM check WNL today , 3.5 years on battery.   - normalized EF and normal stress test 2/2020     4. HTN:  - well controlled    F/up in 6 months    Electronically signed by CANDY Richardson, 06/23/21, 1:45 PM EDT.    "

## 2021-09-12 PROCEDURE — 93296 REM INTERROG EVL PM/IDS: CPT | Performed by: INTERNAL MEDICINE

## 2021-09-12 PROCEDURE — 93294 REM INTERROG EVL PM/LDLS PM: CPT | Performed by: INTERNAL MEDICINE

## 2022-03-13 PROCEDURE — 93296 REM INTERROG EVL PM/IDS: CPT | Performed by: INTERNAL MEDICINE

## 2022-03-13 PROCEDURE — 93294 REM INTERROG EVL PM/LDLS PM: CPT | Performed by: INTERNAL MEDICINE

## 2022-03-16 ENCOUNTER — OFFICE VISIT (OUTPATIENT)
Dept: CARDIOLOGY | Facility: CLINIC | Age: 82
End: 2022-03-16

## 2022-03-16 VITALS
WEIGHT: 162 LBS | SYSTOLIC BLOOD PRESSURE: 112 MMHG | HEART RATE: 73 BPM | OXYGEN SATURATION: 96 % | BODY MASS INDEX: 28.7 KG/M2 | HEIGHT: 63 IN | DIASTOLIC BLOOD PRESSURE: 78 MMHG

## 2022-03-16 DIAGNOSIS — I48.21 PERMANENT ATRIAL FIBRILLATION: Primary | ICD-10-CM

## 2022-03-16 DIAGNOSIS — I10 ESSENTIAL HYPERTENSION: ICD-10-CM

## 2022-03-16 DIAGNOSIS — I42.0 CARDIOMYOPATHY, DILATED: ICD-10-CM

## 2022-03-16 PROCEDURE — 93281 PM DEVICE PROGR EVAL MULTI: CPT | Performed by: PHYSICIAN ASSISTANT

## 2022-03-16 PROCEDURE — 99213 OFFICE O/P EST LOW 20 MIN: CPT | Performed by: PHYSICIAN ASSISTANT

## 2022-03-16 NOTE — PROGRESS NOTES
Tania Schreiber  1940  [phone]    03/16/2022    [facility] CARDIOLOGY     Referring Provider: No ref. provider found     [facility]anda Nino [facility]  [address] [facility] [facility] [address]    Chief Complaint   Patient presents with   • Atrial Fibrillation          Problem List:      1. Persistent atrial fibrillation/tachy-domo syndrome  a. CHADSVASc = 4 (age >75, female, HTN) on Eliquis  b. S/p ISABELA/ECV, 10/2016  c. ISABELA 10/27/16: EF 45-50%, mild to moderate MR, moderate TR  d. Previously treated with amiodarone, discontinued secondary to thyroid toxicity  e. Echocardiogram 4/12/17: EF 60-65%, no significant valvular disease  f. Echocardiogram 3/6/19: EF 56-60%, mild AS.   g. St Chidi DDD pacemaker implant plus AV node ablation 3/26/2019  h. Initiated Flecainide 5/2019  i. Echocardiogram 7/2/2019 EF 41 to 45%  j. Flecainide discontinued due to drop in EF July 2019  k. Echocardiogram 8/29/19: EF 31-35%  l. S/p upgrade to Bi-V PPM, 8/30/19  m. Echo 1/3/2020 LVEF 55-60%  n. Stress Cardiolyte 2/14/2020 LVEF 63% No ischemia  2. Abnormal MPS:  a. MPS 10/24/16: EF 63%, medium, completely reversible, mid to basal-anteroseptal defect  b. Left heart catheterization 10/25/16: No significant coronary artery disease  c. Repeat stress test February 2020 normal LV function no active ischemia.  3. Hypertension  4. Hyperlipidemia  5. Hypothyroidism  6. GERD  7. CRI  8. Surgical history:  a. Left knee surgery  b. Bilateral cataract surgery  c. Jaw tumor removal  d. Tonsillectomy  e. Cholecystectomy  f. Right knee 11/2020      Allergies  No Known Allergies    Current Medications    Current Outpatient Medications:   •  ALPRAZolam (XANAX) 0.5 MG tablet, Take 0.25-0.5 mg by mouth 2 (Two) Times a Day As Needed for Anxiety., Disp: , Rfl:   •  amLODIPine (NORVASC) 5 MG tablet, Take 5 mg by mouth Daily., Disp: , Rfl:   •  apixaban (Eliquis) 2.5 MG tablet tablet, Take 1 tablet by mouth Every 12 (Twelve) Hours., Disp: 60 tablet,  Rfl: 11  •  atorvastatin (LIPITOR) 20 MG tablet, Take 20 mg by mouth Every Night., Disp: , Rfl:   •  carvedilol (COREG) 6.25 MG tablet, TAKE 1 TABLET BY MOUTH TWICE A DAY, Disp: 180 tablet, Rfl: 3  •  Cyanocobalamin (B-12 PO), Take 1 tablet by mouth Daily., Disp: , Rfl:   •  doxazosin (CARDURA) 4 MG tablet, Take 1 tablet by mouth Every Night., Disp: 90 tablet, Rfl: 2  •  Doxylamine Succinate, Sleep, (SLEEP AID PO), Take 1 tablet by mouth As Needed., Disp: , Rfl:   •  fluticasone (FLONASE) 50 MCG/ACT nasal spray, 2 sprays into the nostril(s) as directed by provider Daily As Needed for Rhinitis or Allergies., Disp: , Rfl:   •  furosemide (LASIX) 40 MG tablet, Take 1 tablet by mouth Every Morning., Disp: 90 tablet, Rfl: 0  •  lansoprazole (PREVACID) 30 MG capsule, Take 30 mg by mouth Every Morning., Disp: , Rfl:   •  levothyroxine (SYNTHROID, LEVOTHROID) 50 MCG tablet, Take 50 mcg by mouth Every Night., Disp: , Rfl:   •  Multiple Vitamins-Minerals (PRESERVISION AREDS PO), Take 2 tablets by mouth Every Morning., Disp: , Rfl:   •  Polyethylene Glycol 3350 (MIRALAX PO), Take 1 dose by mouth As Needed (constipation)., Disp: , Rfl:   •  Restasis 0.05 % ophthalmic emulsion, INSTILL 1 DROP INTO BOTH EYES TWICE A DAY, Disp: , Rfl:   •  sertraline (ZOLOFT) 25 MG tablet, Take 25 mg by mouth Daily., Disp: , Rfl:   •  sucralfate (CARAFATE) 1 g tablet, Take 1 g by mouth 3 (Three) Times a Day As Needed., Disp: , Rfl:     History of Present Illness     Pt presents for follow up of DCM, permanent atrial fibrillation, HTN.  Since we last saw the pt, pt denies any awareness of AF. She has chronic SOB, no CP, syncope.  She has had a couple episodes where she bends over and stands back up and feels dizzy. She states her BP has been low at times, 60 mmHg diastolic, but she cannot tell me systolic. She is active doing yard work but not doing any exercise. Denies any hospitalizations, ER visits, bleeding issues on Eliquis , or TIA/CVA  "symptoms. Overall feels fatigued.     ROS:  General:  +  fatigue, - weight gain or loss  Cardiovascular:  Denies CP, PND, syncope, near syncope, edema or palpitations.  Pulmonary: + NARAYANAN, - cough, or wheezing      Vitals:    03/16/22 1522   BP: 112/78   BP Location: Left arm   Patient Position: Sitting   Pulse: 73   SpO2: 96%   Weight: 73.5 kg (162 lb)   Height: 160 cm (63\")     Body mass index is 28.7 kg/m².  PE:  General: NAD. A & O x 3   Neck: no JVD, no carotid bruits, no TM  Heart RRR, NL S1, S2, S4 present, no rubs, murmurs  Lungs: CTA, no wheezes, rhonchi, or rales  Abd: soft, non-tender, NL BS  Ext: No musculoskeletal deformities, no edema, cyanosis, or clubbing  Psych: normal mood and affect  Skin: Pm site WNL     Diagnostic Data:    BiV PM Manual Interrogation: BiV paced 99%. 3.4 years on battery. 100% atrial fibrillation     Procedures    1. Permanent atrial fibrillation (HCC)    2. Cardiomyopathy, dilated (HCC)    3. Essential hypertension          Plan:  1. Permanent Atrial Fibrillation:   - s/p AVN ablation in the past  - normal BiV function with 3.4 years on battery      2. Dilated Cardiomyopathy:  - BiV PM check WNL today , 3.4 years on battery.   - normalized EF and normal stress test 2/2020     3. HTN:  - with mild hypotension at times. Will decrease Coreg to 3.125 mg BID to help with this       F/up in 6-8  months    Electronically signed by BECCA Youngblood, 03/16/22, 4:03 PM EDT.      "

## 2022-03-30 ENCOUNTER — TELEPHONE (OUTPATIENT)
Dept: CARDIOLOGY | Facility: CLINIC | Age: 82
End: 2022-03-30

## 2022-04-01 NOTE — TELEPHONE ENCOUNTER
Parth Almazan MD  You 16 hours ago (5:10 PM)       Refer to pulmonology for SOB    Message text

## 2022-04-01 NOTE — TELEPHONE ENCOUNTER
Spoke with patient and updated her on pulmonology referral. She requested to be referred to someone in [place]. I faxed referral to [facility].     (f)[phone]

## 2022-09-11 PROCEDURE — 93294 REM INTERROG EVL PM/LDLS PM: CPT | Performed by: INTERNAL MEDICINE

## 2022-09-11 PROCEDURE — 93296 REM INTERROG EVL PM/IDS: CPT | Performed by: INTERNAL MEDICINE

## 2022-09-16 ENCOUNTER — OFFICE VISIT (OUTPATIENT)
Dept: CARDIOLOGY | Facility: CLINIC | Age: 82
End: 2022-09-16

## 2022-09-16 VITALS
HEIGHT: 63 IN | WEIGHT: 161 LBS | DIASTOLIC BLOOD PRESSURE: 78 MMHG | BODY MASS INDEX: 28.53 KG/M2 | HEART RATE: 72 BPM | SYSTOLIC BLOOD PRESSURE: 138 MMHG | OXYGEN SATURATION: 96 %

## 2022-09-16 DIAGNOSIS — I49.5 TACHY-BRADY SYNDROME: Primary | ICD-10-CM

## 2022-09-16 DIAGNOSIS — I10 ESSENTIAL HYPERTENSION: ICD-10-CM

## 2022-09-16 DIAGNOSIS — I42.0 CARDIOMYOPATHY, DILATED: ICD-10-CM

## 2022-09-16 DIAGNOSIS — I48.21 PERMANENT ATRIAL FIBRILLATION: ICD-10-CM

## 2022-09-16 PROCEDURE — 99214 OFFICE O/P EST MOD 30 MIN: CPT | Performed by: PHYSICIAN ASSISTANT

## 2022-09-16 PROCEDURE — 93000 ELECTROCARDIOGRAM COMPLETE: CPT | Performed by: PHYSICIAN ASSISTANT

## 2022-09-16 NOTE — PROGRESS NOTES
Tania Schreiber  1940  [phone]      [facility] CARDIOLOGY MAIN CAMP[facility]     Referring Provider: No ref. provider found     Manda Nino MD  [address]  [facility] [address]    Chief Complaint   Patient presents with   • PAF   • Hypertension   • Hyperlipidemia   • TACHY/DOMO          Problem List:      1. Persistent atrial fibrillation/tachy-domo syndrome  a. CHADSVASc = 4 (age >75, female, HTN) on Eliquis  b. S/p ISABELA/ECV, 10/2016  c. ISABELA 10/27/16: EF 45-50%, mild to moderate MR, moderate TR  d. Previously treated with amiodarone, discontinued secondary to thyroid toxicity  e. Echocardiogram 4/12/17: EF 60-65%, no significant valvular disease  f. Echocardiogram 3/6/19: EF 56-60%, mild AS.   g. St Chidi DDD pacemaker implant plus AV node ablation 3/26/2019  h. Initiated Flecainide 5/2019  i. Echocardiogram 7/2/2019 EF 41 to 45%  j. Flecainide discontinued due to drop in EF July 2019  k. Echocardiogram 8/29/19: EF 31-35%  l. S/p upgrade to Bi-V PPM, 8/30/19  m. Echo 1/3/2020 LVEF 55-60%  n. Stress Cardiolyte 2/14/2020 LVEF 63% No ischemia  2. Abnormal MPS:  a. MPS 10/24/16: EF 63%, medium, completely reversible, mid to basal-anteroseptal defect  b. Left heart catheterization 10/25/16: No significant coronary artery disease  c. Repeat stress test February 2020 normal LV function no active ischemia.  3. Hypertension  4. Hyperlipidemia  5. Hypothyroidism  6. GERD  7. CRI  8. Surgical history:  a. Left knee surgery  b. Bilateral cataract surgery  c. Jaw tumor removal  d. Tonsillectomy  e. Cholecystectomy  f. Right knee 11/2020      Allergies  No Known Allergies    Current Medications    Current Outpatient Medications:   •  ALPRAZolam (XANAX) 0.5 MG tablet, Take 0.25-0.5 mg by mouth 2 (Two) Times a Day As Needed for Anxiety., Disp: , Rfl:   •  amLODIPine (NORVASC) 5 MG tablet, Take 5 mg by mouth Daily., Disp: , Rfl:   •  apixaban (Eliquis) 2.5 MG tablet tablet, Take 1 tablet by mouth Every  12 (Twelve) Hours., Disp: 60 tablet, Rfl: 11  •  atorvastatin (LIPITOR) 20 MG tablet, Take 20 mg by mouth Every Night., Disp: , Rfl:   •  carvedilol (COREG) 6.25 MG tablet, TAKE 1 TABLET BY MOUTH TWICE A DAY, Disp: 180 tablet, Rfl: 3  •  Cyanocobalamin (B-12 PO), Take 1 tablet by mouth Daily., Disp: , Rfl:   •  doxazosin (CARDURA) 4 MG tablet, Take 1 tablet by mouth Every Night., Disp: 90 tablet, Rfl: 2  •  fluticasone (FLONASE) 50 MCG/ACT nasal spray, 2 sprays into the nostril(s) as directed by provider Daily As Needed for Rhinitis or Allergies., Disp: , Rfl:   •  furosemide (LASIX) 40 MG tablet, Take 1 tablet by mouth Every Morning., Disp: 90 tablet, Rfl: 0  •  lansoprazole (PREVACID) 30 MG capsule, Take 30 mg by mouth Every Morning., Disp: , Rfl:   •  levothyroxine (SYNTHROID, LEVOTHROID) 50 MCG tablet, Take 50 mcg by mouth Every Night., Disp: , Rfl:   •  Multiple Vitamins-Minerals (PRESERVISION AREDS PO), Take 2 tablets by mouth Every Morning., Disp: , Rfl:   •  Polyethylene Glycol 3350 (MIRALAX PO), Take 1 dose by mouth As Needed (constipation)., Disp: , Rfl:   •  Restasis 0.05 % ophthalmic emulsion, INSTILL 1 DROP INTO BOTH EYES TWICE A DAY, Disp: , Rfl:   •  sertraline (ZOLOFT) 25 MG tablet, Take 25 mg by mouth Daily., Disp: , Rfl:   •  Doxylamine Succinate, Sleep, (SLEEP AID PO), Take 1 tablet by mouth As Needed., Disp: , Rfl:   •  sucralfate (CARAFATE) 1 g tablet, Take 1 g by mouth 3 (Three) Times a Day As Needed., Disp: , Rfl:     History of Present Illness     Pt presents for follow up of DCM, permanent atrial fibrillation, HTN.  Since we last saw the pt, pt denies any awareness of AF. She has chronic SOB, no CP, syncope. Since we last saw the pt, pt denies any AF episodes, SOB, CP, LH, and dizziness. Denies any hospitalizations, ER visits, bleeding, or TIA/CVA symptoms. Overall feels well.    Vitals:    09/16/22 1102   BP: 138/78   BP Location: Right arm   Patient Position: Sitting   Pulse: 72   SpO2:  "96%   Weight: 73 kg (161 lb)   Height: 160 cm (62.99\")     Body mass index is 28.53 kg/m².  PE:  General: NAD. A & O x 3   Neck: no JVD, no carotid bruits, no TM  Heart RRR, NL S1, S2, S4 present, no rubs, murmurs  Lungs: CTA, no wheezes, rhonchi, or rales  Abd: soft, non-tender, NL BS  Ext: No musculoskeletal deformities, no edema, cyanosis, or clubbing  Psych: normal mood and affect  Skin: Pm site WNL         BiV PM Manual Interrogation: [facility]. BIV PPM, DDDR 70. BIVpaced. Normal RV thresholds 0.62@0.5ms. LV thresholds 2.25v@1.0ms. RV impedance 480 ohms and LV impedance 600ms. Battery voltage 1.1 year. Permanent AFib.     ECG 12 Lead    Date/Time: 9/16/2022 11:47 AM  Performed by: Kirk Klein PA  Authorized by: Kirk Klein PA   Rhythm: atrial fibrillation and paced  Rate: normal  Conduction: conduction normal  ST Segments: ST segments normal  T Waves: T waves normal  QRS axis: normal  Other: no other findings    Clinical impression: abnormal EKG            1. Tachy-domo syndrome (HCC)    2. Permanent atrial fibrillation (HCC)    3. Essential hypertension    4. Cardiomyopathy, dilated (HCC)          Plan:  1. Permanent Atrial Fibrillation:   - s/p AVN ablation in the past  - normal BiV function with 1.1 years on battery      2. Dilated Cardiomyopathy:  - BiV PM, Normal function, Battery decrease to only one year remaining.   - normalized EF and normal stress test 2/2020     3. HTN:  - with mild hypotension at times. Will decrease Coreg to 3.125 mg BID to help with this     4. SOB, Negative Pulmonary work up. Normal MPS, Normal EF 2/14/2020.     F/up in 6 months     Electronically signed by BECCA Owen, 09/16/22, 11:51 AM EDT.      "

## 2022-12-11 PROCEDURE — 93294 REM INTERROG EVL PM/LDLS PM: CPT | Performed by: INTERNAL MEDICINE

## 2022-12-11 PROCEDURE — 93296 REM INTERROG EVL PM/IDS: CPT | Performed by: INTERNAL MEDICINE

## 2023-01-01 NOTE — ADDENDUM NOTE
Addended by: RICARDO CLARK on: 7/30/2019 09:19 AM     Modules accepted: Orders    
Statement Selected

## 2023-03-12 PROCEDURE — 93296 REM INTERROG EVL PM/IDS: CPT | Performed by: INTERNAL MEDICINE

## 2023-03-12 PROCEDURE — 93294 REM INTERROG EVL PM/LDLS PM: CPT | Performed by: INTERNAL MEDICINE

## 2023-09-01 ENCOUNTER — OFFICE VISIT (OUTPATIENT)
Dept: CARDIOLOGY | Facility: CLINIC | Age: 83
End: 2023-09-01
Payer: MEDICARE

## 2023-09-01 VITALS
DIASTOLIC BLOOD PRESSURE: 78 MMHG | BODY MASS INDEX: 31.07 KG/M2 | OXYGEN SATURATION: 96 % | HEIGHT: 64 IN | SYSTOLIC BLOOD PRESSURE: 130 MMHG | HEART RATE: 73 BPM | WEIGHT: 182 LBS

## 2023-09-01 DIAGNOSIS — I42.0 CARDIOMYOPATHY, DILATED: ICD-10-CM

## 2023-09-01 DIAGNOSIS — I10 ESSENTIAL HYPERTENSION: ICD-10-CM

## 2023-09-01 DIAGNOSIS — I44.2 CHB (COMPLETE HEART BLOCK): ICD-10-CM

## 2023-09-01 DIAGNOSIS — I49.5 TACHY-BRADY SYNDROME: ICD-10-CM

## 2023-09-01 DIAGNOSIS — I48.21 PERMANENT ATRIAL FIBRILLATION: Primary | ICD-10-CM

## 2023-09-01 NOTE — PROGRESS NOTES
Tania Schreiber  1940  [phone]    09/01/2023    [facility] CARDIOLOGY     Referring Provider: No ref. provider found     Manda Nino MD  [address] [place] [address]    Chief Complaint   Patient presents with    Shortness of Breath       Problem List:   Persistent atrial fibrillation/tachy-domo syndrome  CHADSVASc = 4 (age >75, female, HTN) on Eliquis  S/p ISABELA/ECV, 10/2016  ISABELA 10/27/16: EF 45-50%, mild to moderate MR, moderate TR  Previously treated with amiodarone, discontinued secondary to thyroid toxicity  Echocardiogram 4/12/17: EF 60-65%, no significant valvular disease  Echocardiogram 3/6/19: EF 56-60%, mild AS.   St Chidi DDD pacemaker implant plus AV node ablation 3/26/2019  Initiated Flecainide 5/2019  Echocardiogram 7/2/2019 EF 41 to 45%  Flecainide discontinued due to drop in EF July 2019  Echocardiogram 8/29/19: EF 31-35%  S/p upgrade to Bi-V PPM, 8/30/19  Echo 1/3/2020 LVEF 55-60%  Stress Cardiolyte 2/14/2020 LVEF 63% No ischemia  Abnormal MPS:  MPS 10/24/16: EF 63%, medium, completely reversible, mid to basal-anteroseptal defect  Left heart catheterization 10/25/16: No significant coronary artery disease  Repeat stress test February 2020 normal LV function no active ischemia.  Hypertension  Hyperlipidemia  Hypothyroidism  GERD  CRI  Surgical history:  Left knee surgery  Bilateral cataract surgery  Jaw tumor removal  Tonsillectomy  Cholecystectomy  Right knee 11/2020  Allergies  No Known Allergies    Current Medications    Current Outpatient Medications:     ALPRAZolam (XANAX) 0.5 MG tablet, Take 0.5-1 tablets by mouth 2 (Two) Times a Day As Needed for Anxiety., Disp: , Rfl:     amLODIPine (NORVASC) 5 MG tablet, Take 1 tablet by mouth Daily., Disp: , Rfl:     apixaban (Eliquis) 2.5 MG tablet tablet, Take 1 tablet by mouth Every 12 (Twelve) Hours., Disp: 60 tablet, Rfl: 11    atorvastatin (LIPITOR) 20 MG tablet, Take 1 tablet by mouth Every Night., Disp: , Rfl:     " carvedilol (COREG) 6.25 MG tablet, TAKE 1 TABLET BY MOUTH TWICE A DAY, Disp: 180 tablet, Rfl: 3    doxazosin (CARDURA) 4 MG tablet, Take 1 tablet by mouth Every Night., Disp: 90 tablet, Rfl: 2    Doxylamine Succinate, Sleep, (SLEEP AID PO), Take 1 tablet by mouth As Needed., Disp: , Rfl:     fluticasone (FLONASE) 50 MCG/ACT nasal spray, 2 sprays into the nostril(s) as directed by provider Daily As Needed for Rhinitis or Allergies., Disp: , Rfl:     furosemide (LASIX) 40 MG tablet, Take 1 tablet by mouth Every Morning., Disp: 90 tablet, Rfl: 0    lansoprazole (PREVACID) 30 MG capsule, Take 1 capsule by mouth Every Morning., Disp: , Rfl:     levothyroxine (SYNTHROID, LEVOTHROID) 50 MCG tablet, Take 1 tablet by mouth Every Night., Disp: , Rfl:     Multiple Vitamins-Minerals (PRESERVISION AREDS PO), Take 2 tablets by mouth Every Morning., Disp: , Rfl:     Polyethylene Glycol 3350 (MIRALAX PO), Take 1 dose by mouth As Needed (constipation)., Disp: , Rfl:     Restasis 0.05 % ophthalmic emulsion, Daily., Disp: , Rfl:     sertraline (ZOLOFT) 25 MG tablet, Take 1 tablet by mouth Daily., Disp: , Rfl:     VITAMIN D PO, Take 1 tablet by mouth Daily., Disp: , Rfl:     History of Present Illness     Pt presents for follow up of AF/SSS/HTN. Since we last saw the pt, pt denies any palpitations, CP, LH, and dizziness. Denies any hospitalizations, ER visits, bleeding, or TIA/CVA symptoms. Overall feels very.  Her biggest issue is fatigue.  She does note some shortness of breath with exertion on occasion.  She has been compliant with her medical therapy.  Blood pressures at home have been very well controlled.        Vitals:    09/01/23 1253 09/01/23 1311   BP: 146/90 130/78   BP Location: Left arm    Patient Position: Sitting    Pulse: 73    SpO2: 96%    Weight: 82.6 kg (182 lb)    Height: 161.3 cm (63.5\")      Body mass index is 31.73 kg/mý.  PE:  General: NAD  Neck: no JVD, no carotid bruits, no TM  Heart RRR, NL S1, S2, no rubs, " murmurs  Lungs: CTA, no wheezes, rhonchi, or rales  Abd: soft, non-tender, NL BS  Ext: No musculoskeletal deformities, no edema, cyanosis, or clubbing  Psych: normal mood and affect    Diagnostic Data:    Interrogation of device demonstrates normal BiV pacing function.  The patient has approximately 2.9-minute months to NARAYAN.  LV threshold 2.25 V at 1 ms.  99% BiV paced.  100% A-fib.  Complete heart block noted.  No ventricular escape noted.      Procedures        1. Permanent atrial fibrillation    2. Tachy-domo syndrome    3. CHB (complete heart block)    4. Cardiomyopathy, dilated    5. Essential hypertension          Plan:    1. Permanent Atrial Fibrillation/CHB  - s/p AVN ablation in the past  - normal BiV function with less than 3 months left to reach NARAYAN.  We will set up as an outpatient in the next month or pacemaker generator change out as the patient does have elevated LV lead threshold and is completely dependent.     2. Dilated Cardiomyopathy/CHF:   - BiV PM, Normal function,   - normalized EF and normal stress test 2/2020     3. HTN: Very well controlled at home running 120/80 mmHg.  No changes today.        F/up in 6 months

## 2023-09-10 PROCEDURE — 93296 REM INTERROG EVL PM/IDS: CPT | Performed by: INTERNAL MEDICINE

## 2023-09-10 PROCEDURE — 93294 REM INTERROG EVL PM/LDLS PM: CPT | Performed by: INTERNAL MEDICINE

## 2023-09-18 ENCOUNTER — TELEPHONE (OUTPATIENT)
Dept: CARDIOLOGY | Facility: CLINIC | Age: 83
End: 2023-09-18
Payer: MEDICARE

## 2023-09-18 NOTE — TELEPHONE ENCOUNTER
Called pt back & advised her that the insurance issue was for [facility] (doctor's offices) but NOT [facility]. Further advised that her pacemaker generator change would be covered by her insurance since the gen change would be performed in the hospital setting.     Advised pt a follow up appointment has been scheduled for 3/1/2024 in [place], [place].     Pt verbalized understanding about both insurance & appointments.

## 2023-09-18 NOTE — TELEPHONE ENCOUNTER
[employer] pt ombudsman called with pt on the line. Pt questioned her next follow up appointment date which is 2025. Pt also expressed concern about her BIV pacemaker battery status since her device had 2.9 months until NARAYAN @ 9/1/2023 in office interrogation. Pt also had question about her [facility] Medicare Replacement policy.     Office note from 9/1/2023 & device interrogation reviewed. Advised & reassured pt that her remote transmissions had been placed on a monthly schedule since 11/2022. Additionally educated pt that her device would send a separate notification when the battery status reached Elective Replacement Interval.     Advised pt her next office follow up schedule would be timed after her generator change & that the 2025 appointment date was erroneous.     Regarding insurance, I advised pt that I cannot speak on this as it is not an area that I have any knowledge. The [facility] representative then stated negotiations were currently in progress.     Both the pt & the [employer] ombudsman verbalized understanding of the above information.

## 2023-10-12 ENCOUNTER — TELEPHONE (OUTPATIENT)
Dept: CARDIOLOGY | Facility: CLINIC | Age: 83
End: 2023-10-12
Payer: MEDICARE

## 2023-10-12 NOTE — TELEPHONE ENCOUNTER
You saw the patient in clinic on 9/1/23 and wanted her to be scheduled to have a *SJM* pacemaker generator change because she was reaching NARAYAN. How long does she need to hold Eliquis prior to?

## 2023-10-13 NOTE — TELEPHONE ENCOUNTER
"I spoke with patient, she understands to hold Eliquis for 2 days.     There is no order placed for generator change at this time. You noted in the last dictation: \"We will set up as an outpatient in the next month or pacemaker generator change out as the patient does have elevated LV lead threshold and is completely dependent\"    OK to place order?  On 9/1/2023 patient had 2.9 months left on battery (Bi-V PPM)       Please advise   "

## 2023-10-16 DIAGNOSIS — I49.5 TACHY-BRADY SYNDROME: ICD-10-CM

## 2023-10-16 DIAGNOSIS — I42.0 CARDIOMYOPATHY, DILATED: Primary | ICD-10-CM

## 2023-11-03 ENCOUNTER — PREP FOR SURGERY (OUTPATIENT)
Dept: OTHER | Facility: HOSPITAL | Age: 83
End: 2023-11-03
Payer: MEDICARE

## 2023-11-03 DIAGNOSIS — I42.0 CARDIOMYOPATHY, DILATED: Primary | ICD-10-CM

## 2023-11-03 RX ORDER — SODIUM CHLORIDE 0.9 % (FLUSH) 0.9 %
10 SYRINGE (ML) INJECTION AS NEEDED
OUTPATIENT
Start: 2023-11-03

## 2023-11-03 RX ORDER — ACETAMINOPHEN 325 MG/1
650 TABLET ORAL EVERY 4 HOURS PRN
OUTPATIENT
Start: 2023-11-03

## 2023-11-03 RX ORDER — SODIUM CHLORIDE 9 MG/ML
40 INJECTION, SOLUTION INTRAVENOUS AS NEEDED
OUTPATIENT
Start: 2023-11-03

## 2023-11-03 RX ORDER — SODIUM CHLORIDE 0.9 % (FLUSH) 0.9 %
3 SYRINGE (ML) INJECTION EVERY 12 HOURS SCHEDULED
OUTPATIENT
Start: 2023-11-03

## 2023-11-03 RX ORDER — SODIUM CHLORIDE 9 MG/ML
1 INJECTION, SOLUTION INTRAVENOUS CONTINUOUS
OUTPATIENT
Start: 2023-11-03 | End: 2023-11-03

## 2023-11-03 RX ORDER — NITROGLYCERIN 0.4 MG/1
0.4 TABLET SUBLINGUAL
OUTPATIENT
Start: 2023-11-03

## 2023-11-03 RX ORDER — CEFAZOLIN SODIUM 2 G/100ML
2000 INJECTION, SOLUTION INTRAVENOUS ONCE
OUTPATIENT
Start: 2023-11-03 | End: 2023-11-03

## 2023-11-07 ENCOUNTER — HOSPITAL ENCOUNTER (OUTPATIENT)
Facility: HOSPITAL | Age: 83
Discharge: HOME | End: 2023-11-07
Attending: INTERNAL MEDICINE | Admitting: INTERNAL MEDICINE
Payer: MEDICARE

## 2023-11-07 VITALS
HEART RATE: 74 BPM | WEIGHT: 165.6 LBS | RESPIRATION RATE: 18 BRPM | DIASTOLIC BLOOD PRESSURE: 87 MMHG | OXYGEN SATURATION: 91 % | HEIGHT: 63 IN | BODY MASS INDEX: 29.34 KG/M2 | TEMPERATURE: 97.8 F | SYSTOLIC BLOOD PRESSURE: 150 MMHG

## 2023-11-07 DIAGNOSIS — I49.5 TACHY-BRADY SYNDROME: ICD-10-CM

## 2023-11-07 DIAGNOSIS — I42.0 CARDIOMYOPATHY, DILATED: ICD-10-CM

## 2023-11-07 LAB
ANION GAP SERPL CALCULATED.3IONS-SCNC: 9 MMOL/L (ref 5–15)
BUN SERPL-MCNC: 19 MG/DL (ref 8–23)
BUN/CREAT SERPL: 14.7 (ref 7–25)
CALCIUM SPEC-SCNC: 9.8 MG/DL (ref 8.6–10.5)
CHLORIDE SERPL-SCNC: 104 MMOL/L (ref 98–107)
CO2 SERPL-SCNC: 29 MMOL/L (ref 22–29)
CREAT SERPL-MCNC: 1.29 MG/DL (ref 0.57–1)
DEPRECATED RDW RBC AUTO: 49.8 FL (ref 37–54)
EGFRCR SERPLBLD CKD-EPI 2021: 41 ML/MIN/1.73
ERYTHROCYTE [DISTWIDTH] IN BLOOD BY AUTOMATED COUNT: 14.1 % (ref 12.3–15.4)
GLUCOSE SERPL-MCNC: 133 MG/DL (ref 65–99)
HBA1C MFR BLD: 5.8 % (ref 4.8–5.6)
HCT VFR BLD AUTO: 39.7 % (ref 34–46.6)
HGB BLD-MCNC: 12.7 G/DL (ref 12–15.9)
MCH RBC QN AUTO: 30.6 PG (ref 26.6–33)
MCHC RBC AUTO-ENTMCNC: 32 G/DL (ref 31.5–35.7)
MCV RBC AUTO: 95.7 FL (ref 79–97)
PLATELET # BLD AUTO: 223 10*3/MM3 (ref 140–450)
PMV BLD AUTO: 10.2 FL (ref 6–12)
POTASSIUM SERPL-SCNC: 3.9 MMOL/L (ref 3.5–5.2)
RBC # BLD AUTO: 4.15 10*6/MM3 (ref 3.77–5.28)
SODIUM SERPL-SCNC: 142 MMOL/L (ref 136–145)
WBC NRBC COR # BLD: 6.33 10*3/MM3 (ref 3.4–10.8)

## 2023-11-07 PROCEDURE — 25010000002 ONDANSETRON PER 1 MG: Performed by: INTERNAL MEDICINE

## 2023-11-07 PROCEDURE — 25810000003 SODIUM CHLORIDE 0.9 % SOLUTION: Performed by: PHYSICIAN ASSISTANT

## 2023-11-07 PROCEDURE — 25010000002 FENTANYL CITRATE (PF) 50 MCG/ML SOLUTION: Performed by: INTERNAL MEDICINE

## 2023-11-07 PROCEDURE — 33229 REMV&REPLC PM GEN MULT LEADS: CPT | Performed by: INTERNAL MEDICINE

## 2023-11-07 PROCEDURE — 99152 MOD SED SAME PHYS/QHP 5/>YRS: CPT | Performed by: INTERNAL MEDICINE

## 2023-11-07 PROCEDURE — 25010000002 MIDAZOLAM PER 1 MG: Performed by: INTERNAL MEDICINE

## 2023-11-07 PROCEDURE — 25010000002 BUPIVACAINE 0.5 % SOLUTION: Performed by: INTERNAL MEDICINE

## 2023-11-07 PROCEDURE — 25010000002 CEFAZOLIN PER 500 MG: Performed by: PHYSICIAN ASSISTANT

## 2023-11-07 PROCEDURE — 83036 HEMOGLOBIN GLYCOSYLATED A1C: CPT | Performed by: PHYSICIAN ASSISTANT

## 2023-11-07 PROCEDURE — C2621 PMKR, OTHER THAN SING/DUAL: HCPCS | Performed by: INTERNAL MEDICINE

## 2023-11-07 PROCEDURE — 85027 COMPLETE CBC AUTOMATED: CPT | Performed by: PHYSICIAN ASSISTANT

## 2023-11-07 PROCEDURE — 80048 BASIC METABOLIC PNL TOTAL CA: CPT | Performed by: PHYSICIAN ASSISTANT

## 2023-11-07 DEVICE — IMPLANTABLE DEVICE: Type: IMPLANTABLE DEVICE | Site: CHEST | Status: FUNCTIONAL

## 2023-11-07 RX ORDER — NITROGLYCERIN 0.4 MG/1
0.4 TABLET SUBLINGUAL
Status: DISCONTINUED | OUTPATIENT
Start: 2023-11-07 | End: 2023-11-07 | Stop reason: HOSPADM

## 2023-11-07 RX ORDER — SODIUM CHLORIDE 0.9 % (FLUSH) 0.9 %
3 SYRINGE (ML) INJECTION EVERY 12 HOURS SCHEDULED
Status: DISCONTINUED | OUTPATIENT
Start: 2023-11-07 | End: 2023-11-07 | Stop reason: HOSPADM

## 2023-11-07 RX ORDER — ACETAMINOPHEN 650 MG/1
650 SUPPOSITORY RECTAL EVERY 4 HOURS PRN
Status: DISCONTINUED | OUTPATIENT
Start: 2023-11-07 | End: 2023-11-07 | Stop reason: HOSPADM

## 2023-11-07 RX ORDER — BUPIVACAINE HYDROCHLORIDE 5 MG/ML
INJECTION, SOLUTION PERINEURAL
Status: DISCONTINUED | OUTPATIENT
Start: 2023-11-07 | End: 2023-11-07 | Stop reason: HOSPADM

## 2023-11-07 RX ORDER — ONDANSETRON 2 MG/ML
INJECTION INTRAMUSCULAR; INTRAVENOUS
Status: DISCONTINUED | OUTPATIENT
Start: 2023-11-07 | End: 2023-11-07 | Stop reason: HOSPADM

## 2023-11-07 RX ORDER — LIDOCAINE HYDROCHLORIDE 5 MG/ML
INJECTION, SOLUTION INFILTRATION; PERINEURAL
Status: DISCONTINUED | OUTPATIENT
Start: 2023-11-07 | End: 2023-11-07 | Stop reason: HOSPADM

## 2023-11-07 RX ORDER — SODIUM CHLORIDE 0.9 % (FLUSH) 0.9 %
10 SYRINGE (ML) INJECTION AS NEEDED
Status: DISCONTINUED | OUTPATIENT
Start: 2023-11-07 | End: 2023-11-07 | Stop reason: HOSPADM

## 2023-11-07 RX ORDER — FENTANYL CITRATE 50 UG/ML
INJECTION, SOLUTION INTRAMUSCULAR; INTRAVENOUS
Status: DISCONTINUED | OUTPATIENT
Start: 2023-11-07 | End: 2023-11-07 | Stop reason: HOSPADM

## 2023-11-07 RX ORDER — SODIUM CHLORIDE 9 MG/ML
1 INJECTION, SOLUTION INTRAVENOUS CONTINUOUS
Status: ACTIVE | OUTPATIENT
Start: 2023-11-07 | End: 2023-11-07

## 2023-11-07 RX ORDER — SODIUM CHLORIDE 9 MG/ML
40 INJECTION, SOLUTION INTRAVENOUS AS NEEDED
Status: DISCONTINUED | OUTPATIENT
Start: 2023-11-07 | End: 2023-11-07 | Stop reason: HOSPADM

## 2023-11-07 RX ORDER — ONDANSETRON 2 MG/ML
4 INJECTION INTRAMUSCULAR; INTRAVENOUS EVERY 6 HOURS PRN
Status: DISCONTINUED | OUTPATIENT
Start: 2023-11-07 | End: 2023-11-07 | Stop reason: HOSPADM

## 2023-11-07 RX ORDER — ACETAMINOPHEN 325 MG/1
650 TABLET ORAL EVERY 4 HOURS PRN
Status: DISCONTINUED | OUTPATIENT
Start: 2023-11-07 | End: 2023-11-07 | Stop reason: HOSPADM

## 2023-11-07 RX ORDER — MIDAZOLAM HYDROCHLORIDE 1 MG/ML
INJECTION INTRAMUSCULAR; INTRAVENOUS
Status: DISCONTINUED | OUTPATIENT
Start: 2023-11-07 | End: 2023-11-07 | Stop reason: HOSPADM

## 2023-11-07 RX ADMIN — SODIUM CHLORIDE 2000 MG: 900 INJECTION INTRAVENOUS at 09:23

## 2023-11-07 RX ADMIN — SODIUM CHLORIDE 1 ML/KG/HR: 9 INJECTION, SOLUTION INTRAVENOUS at 07:31

## 2023-11-07 NOTE — H&P
Cardiology H&P     Tania Schreiber  1940  [phone]  There is no work phone number on file.    11/07/23    DATE OF ADMISSION: 11/7/2023  [facility] Manda Chaudhari MD  [address] / [place] [address]  Referring Provider: Parth Almazan MD     CC: PPM battery approaching NARAYAN     Problem List:   Persistent atrial fibrillation/tachy-domo syndrome  CHADSVASc = 4 (age >75, female, HTN) on Eliquis  S/p ISABELA/ECV, 10/2016  ISABELA 10/27/16: EF 45-50%, mild to moderate MR, moderate TR  Previously treated with amiodarone, discontinued secondary to thyroid toxicity  Echocardiogram 4/12/17: EF 60-65%, no significant valvular disease  Echocardiogram 3/6/19: EF 56-60%, mild AS.   [employer]idi DDD pacemaker implant plus AV node ablation 3/26/2019  Initiated Flecainide 5/2019  Echocardiogram 7/2/2019 EF 41 to 45%  Flecainide discontinued due to drop in EF July 2019  Echocardiogram 8/29/19: EF 31-35%  S/p upgrade to Bi-V PPM, 8/30/19  Echo 1/3/2020 LVEF 55-60%  Stress Cardiolyte 2/14/2020 LVEF 63% No ischemia  Abnormal MPS:  MPS 10/24/16: EF 63%, medium, completely reversible, mid to basal-anteroseptal defect  Left heart catheterization 10/25/16: No significant coronary artery disease  Repeat stress test February 2020 normal LV function no active ischemia.  Hypertension  Hyperlipidemia  Hypothyroidism  GERD  CRI  Surgical history:  Left knee surgery  Bilateral cataract surgery  Jaw tumor removal  Tonsillectomy  Cholecystectomy  Right knee 11/2020    History of Present Illness:   Tania Schreiber is an 82-year-old female with above past medical history who presents today for biventricular pacemaker generator change.  She has a history of persistent atrial fibrillation and tachybradycardia syndrome for which she underwent AV node ablation and [employer] dual-chamber pacemaker implant 3/26/2019.  Her EF was noted to drop July 2019 so her pacemaker was upgraded to a biventricular.  At last office visit  9/1/2023, device interrogation showed less than 3 months left to reach NARAYAN so she was scheduled for generator change today.  Patient continues to have baseline fatigue, weakness, shortness of breath.  She denies palpitations.  She denies recent infection, fever, chills, open wounds, hospitalization, signs of CVA/TIA.  She is compliant with anticoagulation, her last dose of Eliquis was 11/4/2023.      No Known Allergies    Prior to Admission Medications       Prescriptions Last Dose Informant Patient Reported? Taking?    ALPRAZolam (XANAX) 0.5 MG tablet Past Month Medication Bottle Yes Yes    Take 0.5-1 tablets by mouth 2 (Two) Times a Day As Needed for Anxiety.    amLODIPine (NORVASC) 5 MG tablet 11/7/2023  Yes Yes    Take 1 tablet by mouth Daily.    apixaban (Eliquis) 2.5 MG tablet tablet 11/4/2023  No No    Take 1 tablet by mouth Every 12 (Twelve) Hours.    atorvastatin (LIPITOR) 20 MG tablet 11/6/2023 Self Yes Yes    Take 1 tablet by mouth Every Night.    carvedilol (COREG) 6.25 MG tablet 11/7/2023  No Yes    TAKE 1 TABLET BY MOUTH TWICE A DAY    doxazosin (CARDURA) 4 MG tablet 11/6/2023  No Yes    Take 1 tablet by mouth Every Night.    Doxylamine Succinate, Sleep, (SLEEP AID PO)   Yes No    Take 1 tablet by mouth As Needed.    fluticasone (FLONASE) 50 MCG/ACT nasal spray 11/6/2023 Self Yes Yes    2 sprays into the nostril(s) as directed by provider Daily As Needed for Rhinitis or Allergies.    furosemide (LASIX) 40 MG tablet 11/6/2023  No Yes    Take 1 tablet by mouth Every Morning.    lansoprazole (PREVACID) 30 MG capsule 11/6/2023 Self Yes Yes    Take 1 capsule by mouth Every Morning.    levothyroxine (SYNTHROID, LEVOTHROID) 50 MCG tablet 11/6/2023 Self Yes Yes    Take 1 tablet by mouth Every Night.    Multiple Vitamins-Minerals (PRESERVISION AREDS PO) 11/6/2023 Self Yes Yes    Take 2 tablets by mouth Every Morning.    Polyethylene Glycol 3350 (MIRALAX PO)  Self Yes No    Take 1 dose by mouth As Needed  (constipation).    Restasis 0.05 % ophthalmic emulsion 11/6/2023  Yes Yes    Daily.    sertraline (ZOLOFT) 25 MG tablet 11/6/2023  Yes Yes    Take 1 tablet by mouth Daily.    VITAMIN D PO 11/6/2023  Yes Yes    Take 1 tablet by mouth Daily.              Current Facility-Administered Medications:     acetaminophen (TYLENOL) tablet 650 mg, 650 mg, Oral, Q4H PRN, Mary Alonso PA    ceFAZolin 2000 mg IVPB in 100 mL NS (MBP), 2,000 mg, Intravenous, Once, Mary Alonso PA    nitroglycerin (NITROSTAT) SL tablet 0.4 mg, 0.4 mg, Sublingual, Q5 Min PRN, Mary Alonso PA    sodium chloride 0.9 % flush 10 mL, 10 mL, Intravenous, PRN, Mary Alonso PA    sodium chloride 0.9 % flush 3 mL, 3 mL, Intravenous, Q12H, Mary Alonso PA    sodium chloride 0.9 % infusion 40 mL, 40 mL, Intravenous, PRN, Mary Alonso PA    sodium chloride 0.9 % infusion, 1 mL/kg/hr (Order-Specific), Intravenous, Continuous, Mary Alonso PA, Last Rate: 82.6 mL/hr at 11/07/23 0731, 1 mL/kg/hr at 11/07/23 0731    Social History     Socioeconomic History    Marital status: Single   Tobacco Use    Smoking status: Never    Smokeless tobacco: Never   Vaping Use    Vaping Use: Never used   Substance and Sexual Activity    Alcohol use: No    Drug use: No    Sexual activity: Defer       History reviewed. No pertinent family history.    REVIEW OF SYSTEMS:   CONSTITUTIONAL:         No weight loss, fever, chills, +weakness + fatigue.   HEENT:                            No visual loss, blurred vision, double vision, yellow sclerae.                                             No hearing loss, congestion, sore throat.   SKIN:                                No rashes, urticaria, ulcers, sores.     RESPIRATORY:               + shortness of breath, -hemoptysis, cough, sputum.   GI:                                     No anorexia, nausea, vomiting, diarrhea. No abdominal pain, melena.   :                                   No burning on urination,  "hematuria or increased frequency.  ENDOCRINE:                   No diaphoresis, cold or heat intolerance. No polyuria or polydipsia.   NEURO:                            No headache, dizziness, syncope, paralysis, ataxia, or parasthesias.                                            No change in bowel or bladder control. No history of CVA/TIA  MUSCULOSKELETAL:    No muscle, back pain, joint pain or stiffness.   HEMATOLOGY:               No anemia, bleeding, bruising. No history of DVT/PE.  PSYCH:                            No history of depression, anxiety    Vitals:    11/07/23 0711 11/07/23 0717 11/07/23 0718   BP:  146/84 151/83   BP Location:  Right arm Left arm   Patient Position:  Lying Lying   Pulse:   72   Resp:   17   Temp:  97.8 °F (36.6 °C)    TempSrc:  Temporal    SpO2:   94%   Weight: 75.1 kg (165 lb 9.6 oz)     Height: 160 cm (63\")           Vital Sign Min/Max for last 24 hours  Temp  Min: 97.8 °F (36.6 °C)  Max: 97.8 °F (36.6 °C)   BP  Min: 146/84  Max: 151/83   Pulse  Min: 72  Max: 72   Resp  Min: 17  Max: 17   SpO2  Min: 94 %  Max: 94 %   No data recorded    No intake or output data in the 24 hours ending 11/07/23 0817          Physical Exam:  GEN: Well nourished, Well- developed  No acute distress  HEENT: Normocephalic, Atraumatic  NECK: supple, NO JVD  CARDIAC: S1S2 paced rhythm, normal rate, no murmur, gallop, rub  LUNGS: Clear to ausculation, normal respiratory effort  ABDOMEN: Soft, nontender, normal bowel sounds  EXTREMITIES:No gross deformities,  No clubbing, cyanosis, or edema  SKIN: Warm, dry  NEURO: No focal deficits  PSYCHIATRIC: Normal affect and mood      I personally viewed and interpreted the patient's EKG/Telemetry/lab data    Data:   Results from last 7 days   Lab Units 11/07/23  0723   WBC 10*3/mm3 6.33   HEMOGLOBIN g/dL 12.7   HEMATOCRIT % 39.7   PLATELETS 10*3/mm3 223     Results from last 7 days   Lab Units 11/07/23  0723   SODIUM mmol/L 142   POTASSIUM mmol/L 3.9   CHLORIDE mmol/L " 104   CO2 mmol/L 29.0   BUN mg/dL 19   CREATININE mg/dL 1.29*   GLUCOSE mg/dL 133*                                  No intake or output data in the 24 hours ending 11/07/23 0817        Telemetry: V paced, heart rate 70 bpm      Assessment and Plan:   1. Permanent Atrial Fibrillation/CHB  - s/p AVN ablation in the past and [employer]idi DDD PPM 3/26/19. Device upgraded to BiV PPM 8/30/19.   - normal BiV PPM function with less than 3 months left to reach NARAYAN at last office visit 9/1/23.    -Plan for BiV PPM generator change today. The risks, benefits, and alternatives of the procedure have been reviewed and the patient wishes to proceed.      2. Dilated Cardiomyopathy/CHF:   - BiV PM, Normal function  - normalized EF and normal stress test 2/2020     3. HTN: Pt reports well controlled at home    4. Renal insufficiency  -baseline creatinine 1.3, today creatinine is 1.29    5. Anticoagulation : Eliquis, last dose: 11/4/23    Electronically signed by CANDY Viera, 11/07/23, 8:17 AM EST.    I have read the above note and agree

## 2023-11-15 ENCOUNTER — OFFICE VISIT (OUTPATIENT)
Dept: CARDIOLOGY | Facility: CLINIC | Age: 83
End: 2023-11-15
Payer: MEDICARE

## 2023-11-15 DIAGNOSIS — I49.5 TACHY-BRADY SYNDROME: Primary | ICD-10-CM

## 2023-11-15 NOTE — PROGRESS NOTES
11/15/2023    Name: Tania Schreiber  YOB: 1940    Elpidio Bailey, DO    Patient has fever: [] YES   [x] NO     Temperature if indicated:     Wound Location:  Left Shoulder     Surgical Glue: intact     Wound Appearance: clear/intact     Plan: normal wound check      Did patient receive home monitoring box? [x] YES   [] NO  (If no, contact device clinic.)    Does patient have questions about remote monitoring? [] YES   [x] NO (If yes notify device clinic)      Notes:       Appointment for follow-up scheduled for 3 months post procedure [x]    Future Appointments   Date Time Provider Department Center   2/2/2024  2:30 PM Parth Almazan MD [facility]   2/7/2025  1:00 PM Parth Almazan MD [facility]          Luz Marina Maldonado MA, 11/15/23

## 2024-01-15 ENCOUNTER — TELEPHONE (OUTPATIENT)
Dept: CARDIOLOGY | Facility: CLINIC | Age: 84
End: 2024-01-15
Payer: MEDICARE

## 2024-01-15 NOTE — TELEPHONE ENCOUNTER
SENT APPT REMINDER PAPER CLARIFICATION IN THE MAIL THAT ONE IS THIS YEAR 2024 AND THE OTHER APPT IS NEXT YEAR 2025

## 2024-01-15 NOTE — TELEPHONE ENCOUNTER
Caller: Tania Schreiber    Relationship: Self    Best call back number: [phone]    What is the best time to reach you: ANYTIME     Who are you requesting to speak with (clinical staff, provider,  specific staff member): ANYONE     What was the call regarding: PATIENT REQUESTING A CALL BACK WITH CLARIFICATION ON THE 2 APPTS SHE HAS SCHEDULED WITH .

## 2024-01-19 NOTE — DISCHARGE INSTRUCTIONS

## 2024-02-02 ENCOUNTER — OFFICE VISIT (OUTPATIENT)
Dept: CARDIOLOGY | Facility: CLINIC | Age: 84
End: 2024-02-02
Payer: MEDICARE

## 2024-02-02 VITALS
OXYGEN SATURATION: 96 % | DIASTOLIC BLOOD PRESSURE: 78 MMHG | HEART RATE: 69 BPM | WEIGHT: 164 LBS | BODY MASS INDEX: 28 KG/M2 | SYSTOLIC BLOOD PRESSURE: 136 MMHG | HEIGHT: 64 IN

## 2024-02-02 DIAGNOSIS — I10 ESSENTIAL HYPERTENSION: ICD-10-CM

## 2024-02-02 DIAGNOSIS — I49.5 TACHY-BRADY SYNDROME: ICD-10-CM

## 2024-02-02 DIAGNOSIS — I48.21 PERMANENT ATRIAL FIBRILLATION: Primary | ICD-10-CM

## 2024-02-02 DIAGNOSIS — I42.0 CARDIOMYOPATHY, DILATED: ICD-10-CM

## 2024-02-02 NOTE — PROGRESS NOTES
Tania Schreiber  1940  [phone]    02/02/2024    [facility] CARDIOLOGY     Referring Provider: No ref. provider found     Manda Nino MD  [address] [place] [address]    Chief Complaint   Patient presents with    Tachy-domo syndrome     Problem List:   Persistent atrial fibrillation/tachy-domo syndrome  CHADSVASc = 4 (age >75, female, HTN) on Eliquis  S/p ISABELA/ECV, 10/2016  ISABELA 10/27/16: EF 45-50%, mild to moderate MR, moderate TR  Previously treated with amiodarone, discontinued secondary to thyroid toxicity  Echocardiogram 4/12/17: EF 60-65%, no significant valvular disease  Echocardiogram 3/6/19: EF 56-60%, mild AS.   [employer]idi DDD pacemaker implant plus AV node ablation 3/26/2019  Initiated Flecainide 5/2019  Echocardiogram 7/2/2019 EF 41 to 45%  Flecainide discontinued due to drop in EF July 2019  Echocardiogram 8/29/19: EF 31-35%  S/p upgrade to Bi-V PPM, 8/30/19  Echo 1/3/2020 LVEF 55-60%  Stress Cardiolyte 2/14/2020 LVEF 63% No ischemia  PM Generator change 10/17/23  Abnormal MPS:  MPS 10/24/16: EF 63%, medium, completely reversible, mid to basal-anteroseptal defect  Left heart catheterization 10/25/16: No significant coronary artery disease  Repeat stress test February 2020 normal LV function no active ischemia.  Hypertension  Hyperlipidemia  Hypothyroidism  GERD  CRI  Surgical history:  Left knee surgery  Bilateral cataract surgery  Jaw tumor removal  Tonsillectomy  Cholecystectomy  Right knee 11/2020    Allergies  No Known Allergies    Current Medications    Current Outpatient Medications:     ALPRAZolam (XANAX) 0.5 MG tablet, Take 0.5-1 tablets by mouth 2 (Two) Times a Day As Needed for Anxiety., Disp: , Rfl:     amLODIPine (NORVASC) 5 MG tablet, Take 1 tablet by mouth Daily., Disp: , Rfl:     apixaban (Eliquis) 2.5 MG tablet tablet, Take 1 tablet by mouth Every 12 (Twelve) Hours. First dose on Thursday a.m., Disp: 60 tablet, Rfl: 11    atorvastatin (LIPITOR) 20 MG  tablet, Take 1 tablet by mouth Every Night., Disp: , Rfl:     carvedilol (COREG) 6.25 MG tablet, TAKE 1 TABLET BY MOUTH TWICE A DAY, Disp: 180 tablet, Rfl: 3    doxazosin (CARDURA) 4 MG tablet, Take 1 tablet by mouth Every Night., Disp: 90 tablet, Rfl: 2    Doxylamine Succinate, Sleep, (SLEEP AID PO), Take 1 tablet by mouth As Needed., Disp: , Rfl:     fluticasone (FLONASE) 50 MCG/ACT nasal spray, 2 sprays into the nostril(s) as directed by provider Daily As Needed for Rhinitis or Allergies., Disp: , Rfl:     furosemide (LASIX) 40 MG tablet, Take 1 tablet by mouth Every Morning., Disp: 90 tablet, Rfl: 0    lansoprazole (PREVACID) 30 MG capsule, Take 1 capsule by mouth Every Morning., Disp: , Rfl:     levothyroxine (SYNTHROID, LEVOTHROID) 50 MCG tablet, Take 1 tablet by mouth Every Night., Disp: , Rfl:     Multiple Vitamins-Minerals (PRESERVISION AREDS PO), Take 2 tablets by mouth Every Morning., Disp: , Rfl:     Polyethylene Glycol 3350 (MIRALAX PO), Take 1 dose by mouth As Needed (constipation)., Disp: , Rfl:     Restasis 0.05 % ophthalmic emulsion, Daily., Disp: , Rfl:     sertraline (ZOLOFT) 25 MG tablet, Take 1 tablet by mouth Daily., Disp: , Rfl:     VITAMIN D PO, Take 1 tablet by mouth Daily., Disp: , Rfl:     History of Present Illness     Pt presents for follow up of persistent afib and tachybrady syndrome. Since we last saw the pt, pt denies any palpitations, CP, LH, and dizziness. She does have some SOB with activity and fatigue but they are baseline for her, no worse. Denies any hospitalizations, ER visits, bleeding on Eliquis, or TIA/CVA symptoms. BP is well controlled. Overall feels well.    ROS:  General:  + fatigue, -weight gain or loss  Cardiovascular:  Denies CP, PND, syncope, near syncope, edema or palpitations.  Pulmonary:  + NARAYANAN, -cough, or wheezing      Vitals:    02/02/24 1446   BP: 136/78   BP Location: Right arm   Patient Position: Sitting   Pulse: 69   SpO2: 96%   Weight: 74.4 kg (164 lb)  "  Height: 161.3 cm (63.5\")     Body mass index is 28.6 kg/m².  PE:  General: NAD  Neck: no JVD, no carotid bruits, no TM  Heart IRR, IRR, NL S1, S2, S4 present, no rubs, murmurs  Lungs: CTA, no wheezes, rhonchi, or rales  Abd: soft, non-tender, NL BS  Ext: No musculoskeletal deformities, no edema, cyanosis, or clubbing  Psych: normal mood and affect    Diagnostic Data:    Interrogation of device demonstrates normal BiV pacing function. 99% BiV paced.  Complete heart block noted.  100% afib. No events.     Procedures        1. Permanent atrial fibrillation    2. Tachy-domo syndrome    3. Cardiomyopathy, dilated    4. Essential hypertension          Plan:  1. Permanent Atrial Fibrillation/CHB  - s/p AVN ablation in the past and [employer]idi DDD PPM 3/26/19. Device upgraded to BiV PPM 8/30/19. s/p PM generator change 10/2023. Recovered well. Normal function on PPM interrogation today.     2. Dilated Cardiomyopathy/CHF:   - BiV PM, Normal function  - normalized EF and normal stress test 2/2020     3. HTN: well controlled at home     4. Anticoagulation : Eliquis    F/up in 6 months    Electronically signed by CANDY Viera, 02/02/24, 3:24 PM EST.      "

## 2024-08-02 ENCOUNTER — OFFICE VISIT (OUTPATIENT)
Dept: CARDIOLOGY | Facility: CLINIC | Age: 84
End: 2024-08-02
Payer: MEDICARE

## 2024-08-02 VITALS
SYSTOLIC BLOOD PRESSURE: 112 MMHG | HEIGHT: 63 IN | WEIGHT: 162.4 LBS | BODY MASS INDEX: 28.77 KG/M2 | HEART RATE: 71 BPM | DIASTOLIC BLOOD PRESSURE: 68 MMHG | OXYGEN SATURATION: 96 %

## 2024-08-02 DIAGNOSIS — I10 ESSENTIAL HYPERTENSION: ICD-10-CM

## 2024-08-02 DIAGNOSIS — I42.0 CARDIOMYOPATHY, DILATED: ICD-10-CM

## 2024-08-02 DIAGNOSIS — I48.21 PERMANENT ATRIAL FIBRILLATION: Primary | ICD-10-CM

## 2024-08-02 NOTE — PROGRESS NOTES
Tania Schreiber  1940  [phone]    08/02/2024    [facility] CARDIOLOGY     Referring Provider: No ref. provider found     Manda Nino MD  [address] [place] [address]    Chief Complaint   Patient presents with    Permanent atrial fibrillation       Problem List:   Persistent atrial fibrillation/tachy-domo syndrome  CHADSVASc = 4 (age >75, female, HTN) on Eliquis  S/p ISABELA/ECV, 10/2016  ISABELA 10/27/16: EF 45-50%, mild to moderate MR, moderate TR  Previously treated with amiodarone, discontinued secondary to thyroid toxicity  Echocardiogram 4/12/17: EF 60-65%, no significant valvular disease  Echocardiogram 3/6/19: EF 56-60%, mild AS.   [employer]idi DDD pacemaker implant plus AV node ablation 3/26/2019  Initiated Flecainide 5/2019  Echocardiogram 7/2/2019 EF 41 to 45%  Flecainide discontinued due to drop in EF July 2019  Echocardiogram 8/29/19: EF 31-35%  S/p upgrade to Bi-V PPM, 8/30/19  Echo 1/3/2020 LVEF 55-60%  Stress Cardiolyte 2/14/2020 LVEF 63% No ischemia  PM Generator change 10/17/23  Abnormal MPS:  MPS 10/24/16: EF 63%, medium, completely reversible, mid to basal-anteroseptal defect  Left heart catheterization 10/25/16: No significant coronary artery disease  Repeat stress test February 2020 normal LV function no active ischemia.  Hypertension  Hyperlipidemia  Hypothyroidism  GERD  CRI  Surgical history:  Left knee surgery  Bilateral cataract surgery  Jaw tumor removal  Tonsillectomy  Cholecystectomy  Right knee 11/2020    Allergies  No Known Allergies    Current Medications    Current Outpatient Medications:     ALPRAZolam (XANAX) 0.5 MG tablet, Take 0.5-1 tablets by mouth 2 (Two) Times a Day As Needed for Anxiety., Disp: , Rfl:     amLODIPine (NORVASC) 5 MG tablet, Take 1 tablet by mouth Daily., Disp: , Rfl:     apixaban (Eliquis) 2.5 MG tablet tablet, Take 1 tablet by mouth Every 12 (Twelve) Hours. First dose on Thursday a.m., Disp: 60 tablet, Rfl: 11    atorvastatin  "(LIPITOR) 20 MG tablet, Take 1 tablet by mouth Every Night., Disp: , Rfl:     carvedilol (COREG) 6.25 MG tablet, TAKE 1 TABLET BY MOUTH TWICE A DAY, Disp: 180 tablet, Rfl: 3    doxazosin (CARDURA) 4 MG tablet, Take 1 tablet by mouth Every Night., Disp: 90 tablet, Rfl: 2    Doxylamine Succinate, Sleep, (SLEEP AID PO), Take 1 tablet by mouth As Needed., Disp: , Rfl:     fluticasone (FLONASE) 50 MCG/ACT nasal spray, 2 sprays into the nostril(s) as directed by provider Daily As Needed for Rhinitis or Allergies., Disp: , Rfl:     furosemide (LASIX) 40 MG tablet, Take 1 tablet by mouth Every Morning., Disp: 90 tablet, Rfl: 0    lansoprazole (PREVACID) 30 MG capsule, Take 1 capsule by mouth Every Morning., Disp: , Rfl:     levothyroxine (SYNTHROID, LEVOTHROID) 50 MCG tablet, Take 1 tablet by mouth Every Night., Disp: , Rfl:     Multiple Vitamins-Minerals (PRESERVISION AREDS PO), Take 2 tablets by mouth Every Morning., Disp: , Rfl:     Polyethylene Glycol 3350 (MIRALAX PO), Take 1 dose by mouth As Needed (constipation)., Disp: , Rfl:     Restasis 0.05 % ophthalmic emulsion, Daily., Disp: , Rfl:     sertraline (ZOLOFT) 25 MG tablet, Take 1 tablet by mouth Daily., Disp: , Rfl:     VITAMIN D PO, Take 1 tablet by mouth Daily., Disp: , Rfl:     History of Present Illness     Pt presents for follow up of persistent atrial fibrillation, tachybrady syndrome, DCM, and BiV PM check.  Since we last saw the pt, pt denies any AF episodes, SOB, CP, LH, and dizziness, syncope. Overall, unchanged since last here. She mowed for 4 hours yesterday in the heat, and feels tired today.  Denies any hospitalizations, ER visits, bleeding issues, or TIA/CVA symptoms. She apparently had an echo in [facility] recently and reports her EF was normal.         Vitals:    08/02/24 1309   BP: 112/68   BP Location: Right arm   Patient Position: Sitting   Pulse: 71   SpO2: 96%   Weight: 73.7 kg (162 lb 6.4 oz)   Height: 160 cm (63\")     Body mass index is " 28.77 kg/m².  PE:  General: NAD. A & O x 3   Neck: no JVD, no carotid bruits, no TM  Heart RRR, NL S1, S2, S4 present, no rubs, murmurs  Lungs: CTA, no wheezes, rhonchi, or rales  Abd: soft, non-tender, NL BS  Ext: No musculoskeletal deformities, no edema, cyanosis, or clubbing  Psych: normal mood and affect    Diagnostic Data:    [employer] BiV PM: BiV paced 99%. Permanent AFIB. 5 -5.6 years on battery. [place] increased to 3 from 2.     Procedures           1. Permanent atrial fibrillation    2. Cardiomyopathy, dilated    3. Essential hypertension          Plan:  1. Permanent Atrial Fibrillation/CHB  - s/p AVN ablation in the past and [employer]idi DDD PPM 3/26/19. Device upgraded to BiV PPM 8/30/19. s/p PM generator change 10/2023. Recovered well. Normal function on PPM interrogation today.      2. Dilated Cardiomyopathy/CHF:   - BiV PM, Normal function  - normalized EF and normal stress test 2/2020. Will get recent echo result from [facility].      3. HTN: well controlled at home     4. Anticoagulation : Eliquis 2.5 mg BID.     F/up in 6 months    Electronically signed by BECCA Youngblood, 08/02/24, 1:19 PM EDT.

## 2025-03-07 ENCOUNTER — OFFICE VISIT (OUTPATIENT)
Dept: CARDIOLOGY | Facility: CLINIC | Age: 85
End: 2025-03-07
Payer: MEDICARE

## 2025-03-07 VITALS
DIASTOLIC BLOOD PRESSURE: 76 MMHG | OXYGEN SATURATION: 96 % | HEART RATE: 88 BPM | HEIGHT: 63 IN | BODY MASS INDEX: 28.35 KG/M2 | SYSTOLIC BLOOD PRESSURE: 136 MMHG | WEIGHT: 160 LBS

## 2025-03-07 DIAGNOSIS — I48.21 PERMANENT ATRIAL FIBRILLATION: Primary | ICD-10-CM

## 2025-03-07 DIAGNOSIS — I42.0 CARDIOMYOPATHY, DILATED: ICD-10-CM

## 2025-03-07 DIAGNOSIS — I38 VALVULAR HEART DISEASE: ICD-10-CM

## 2025-03-07 DIAGNOSIS — I10 ESSENTIAL HYPERTENSION: ICD-10-CM

## 2025-03-07 DIAGNOSIS — I44.2 CHB (COMPLETE HEART BLOCK): ICD-10-CM

## 2025-03-07 NOTE — PROGRESS NOTES
Tania Millerd  1940  [phone]      03/07/2025      [facility] CARDIOLOGY     Manda Nino MD  [address]  [facility] [address]    Chief Complaint   Patient presents with    Permanent atrial fibrillation       Problem List:   Persistent atrial fibrillation/tachy-domo syndrome  CHADSVASc = 4 (age >75, female, HTN) on Eliquis  S/p ISABELA/ECV, 10/2016  ISABELA 10/27/16: EF 45-50%, mild to moderate MR, moderate TR  Previously treated with amiodarone, discontinued secondary to thyroid toxicity  Echocardiogram 4/12/17: EF 60-65%, no significant valvular disease  Echocardiogram 3/6/19: EF 56-60%, mild AS.   St Chidi DDD pacemaker implant plus AV node ablation 3/26/2019  Initiated Flecainide 5/2019  Echocardiogram 7/2/2019 EF 41 to 45%  Flecainide discontinued due to drop in EF July 2019  Echocardiogram 8/29/19: EF 31-35%  S/p upgrade to Bi-V PPM, 8/30/19  Echo 1/3/2020 LVEF 55-60%  Stress Cardiolyte 2/14/2020 LVEF 63% No ischemia  PM Generator change 10/17/23  Abnormal MPS:  MPS 10/24/16: EF 63%, medium, completely reversible, mid to basal-anteroseptal defect  Left heart catheterization 10/25/16: No significant coronary artery disease  Repeat stress test February 2020 normal LV function no active ischemia.  Hypertension  Hyperlipidemia  Hypothyroidism  GERD  CRI  Surgical history:  Left knee surgery  Bilateral cataract surgery  Jaw tumor removal  Tonsillectomy  Cholecystectomy  Right knee 11/2020      Allergies  No Known Allergies    Current Medications    Current Outpatient Medications:     ALPRAZolam (XANAX) 0.5 MG tablet, Take 0.5-1 tablets by mouth 2 (Two) Times a Day As Needed for Anxiety., Disp: , Rfl:     amLODIPine (NORVASC) 5 MG tablet, Take 1 tablet by mouth Daily., Disp: , Rfl:     apixaban (Eliquis) 2.5 MG tablet tablet, Take 1 tablet by mouth Every 12 (Twelve) Hours. First dose on Thursday a.m., Disp: 60 tablet, Rfl: 11    atorvastatin (LIPITOR) 20 MG tablet, Take 1 tablet by mouth  "Every Night., Disp: , Rfl:     carvedilol (COREG) 6.25 MG tablet, TAKE 1 TABLET BY MOUTH TWICE A DAY, Disp: 180 tablet, Rfl: 3    doxazosin (CARDURA) 4 MG tablet, Take 1 tablet by mouth Every Night., Disp: 90 tablet, Rfl: 2    Doxylamine Succinate, Sleep, (SLEEP AID PO), Take 1 tablet by mouth As Needed., Disp: , Rfl:     fluticasone (FLONASE) 50 MCG/ACT nasal spray, Administer 2 sprays into the nostril(s) as directed by provider Daily As Needed for Rhinitis or Allergies., Disp: , Rfl:     furosemide (LASIX) 40 MG tablet, Take 1 tablet by mouth Every Morning., Disp: 90 tablet, Rfl: 0    lansoprazole (PREVACID) 30 MG capsule, Take 1 capsule by mouth Every Morning., Disp: , Rfl:     levothyroxine (SYNTHROID, LEVOTHROID) 50 MCG tablet, Take 1 tablet by mouth Every Night., Disp: , Rfl:     Multiple Vitamins-Minerals (PRESERVISION AREDS PO), Take 2 tablets by mouth Every Morning., Disp: , Rfl:     Polyethylene Glycol 3350 (MIRALAX PO), Take 1 dose by mouth As Needed (constipation)., Disp: , Rfl:     Restasis 0.05 % ophthalmic emulsion, Daily., Disp: , Rfl:     sertraline (ZOLOFT) 25 MG tablet, Take 1 tablet by mouth Daily., Disp: , Rfl:     VITAMIN D PO, Take 1 tablet by mouth Daily., Disp: , Rfl:     History of Present Illness     Pt presents for follow up of AF/tachycardia-bradycardia syndrome/hypertension.Since the pt has seen us in clinic last, pt said she remains tired with inability to stand for long periods of time.  Feels that she has had no real significant improvement since implantation on an upgrade to her biventricular pacemaker.  No recent hospitalization or ER visits.  Has been compliant with her medical therapy.  No bleeding issues on her Eliquis.  Blood pressures have been well-controlled.        Vitals:    03/07/25 1407   BP: 136/76   BP Location: Left arm   Patient Position: Sitting   Pulse: 88   SpO2: 96%   Weight: 72.6 kg (160 lb)   Height: 160 cm (63\")       PE:  General: NAD  Neck: no JVD, no " carotid bruits, no TM  Heart: RRR, NL S1, S2, 3/6 holosystolic murmur appreciated at the left upper sternal border.  Lungs: CTA, no wheezes, rhonchi, or rales  Abd: soft, non-tender, NL BS  Ext: No musculoskeletal deformities, no edema, cyanosis, or clubbing  Psych: normal mood and affect    Diagnostic Data:  Procedures    St Chidi BiV PM: BiV paced 99%. Permanent AFIB.  4.9 years on battery.  No ventricular arrhythmias.          1. Permanent atrial fibrillation    2. CHB (complete heart block)    3. Cardiomyopathy, dilated    4. Essential hypertension    5. Valvular heart disease          Plan:    1. Permanent Atrial Fibrillation/CHB  - s/p AVN ablation in the past and St. Chidi DDD PPM 3/26/19. Device upgraded to BiV PPM 8/30/19. s/p PM generator change 10/2023.  Normal function on PPM interrogation today.      2. Dilated Cardiomyopathy/CHF:   - BiV PM, Normal function; presently class II-III heart failure symptoms.  - normalized EF and normal stress test 2/2020.  [facility] reports that they do not have echocardiogram capability.  Will have her undergo an echocardiogram here at [place] to evaluate LVEF as well as valvular heart disease.     3. HTN: well controlled at home     4. Anticoagulation : Eliquis 2.5 mg BID.     5.  Valvular heart disease see #2.    F/up in 6 months

## 2025-06-05 ENCOUNTER — TELEPHONE (OUTPATIENT)
Dept: CARDIOLOGY | Facility: CLINIC | Age: 85
End: 2025-06-05
Payer: MEDICARE

## 2025-06-05 DIAGNOSIS — I42.0 CARDIOMYOPATHY, DILATED: ICD-10-CM

## 2025-06-05 DIAGNOSIS — I48.21 PERMANENT ATRIAL FIBRILLATION: Primary | ICD-10-CM

## 2025-06-05 RX ORDER — CARVEDILOL 12.5 MG/1
12.5 TABLET ORAL 2 TIMES DAILY
Qty: 180 TABLET | Refills: 3 | Status: SHIPPED | OUTPATIENT
Start: 2025-06-05

## 2025-06-05 NOTE — TELEPHONE ENCOUNTER
Mary, please advise regarding echo results. They are in media tab.           Katey, can you reach out to the patient to discuss appointment.

## 2025-06-05 NOTE — TELEPHONE ENCOUNTER
Echocardiogram shows normal EF and no significant valvular abnormalities. Would add Entresto for diastolic CHF, however, Cr is high. We could try increasing her Coreg if she would like to try that.

## 2025-06-05 NOTE — TELEPHONE ENCOUNTER
Caller: Tania Schreiber    Relationship: Self    Best call back number: [phone]    What is the best time to reach you: ANYTIME    Who are you requesting to speak with (clinical staff, provider,  specific staff member): ANYONE    What was the call regarding: PATIENT WOULD LIKE A CALL BACK WITH ECHO RESULTS. WOULD ALSO LIKE TO DISCUSS 8 MONTH FU THAT IS SUPPOSED TO BE IN NOVEMBER OF THIS YEAR BUT IS CURRENTLY SCHEDULED FOR AUGUST OF NEXT YEAR.

## 2025-06-11 ENCOUNTER — TELEPHONE (OUTPATIENT)
Dept: CARDIOLOGY | Facility: CLINIC | Age: 85
End: 2025-06-11
Payer: MEDICARE

## 2025-06-11 NOTE — TELEPHONE ENCOUNTER
Caller: Tania Schreiber    Relationship: Self    Best call back number: [phone]     Which medication are you concerned about: CARVEDILOL 12.5 MG     Who prescribed you this medication:  KYRIE KENDRICK     When did you start taking this medication: PT ONLY TOOK THIS MEDICATION FOR 2 DAYS, THEN SWITCHED BACK TO 6.25 MG.     What are your concerns:  SOB, HIGH BP, CHEST TIGHTNESS AT NIGHT    How long have you had these concerns:  SINCE PT STARTED TAKING THE 12.5 MG     PT IS DOING OKAY TODAY, FEELS ABOUT THE SAME AS ALWAYS. SHE STOPPED TAKING 12.5 MG AND IS ONLY TAKING 6.25 MG. PLEASE CALL PT, THANK YOU

## (undated) DEVICE — INTRO TEAR AWAY/LVD W/SD PRT 7F 13CM

## (undated) DEVICE — SOL NACL 0.9PCT 1000ML

## (undated) DEVICE — 3M™ STERI-STRIP™ REINFORCED ADHESIVE SKIN CLOSURES, R1547, 1/2 IN X 4 IN (12 MM X 100 MM), 6 STRIPS/ENVELOPE: Brand: 3M™ STERI-STRIP™

## (undated) DEVICE — INTRO TEAR AWAY/LVD W/SD PRT 10F 13CM

## (undated) DEVICE — CAUTERY TIP POLISHER: Brand: DEVON

## (undated) DEVICE — ST INF PRI SMRTSTE 20DRP 2VLV 24ML 117

## (undated) DEVICE — ADULT, W/LG. BACK PAD, RADIOTRANSPARENT ELEMENT AND LEAD WIRE: Brand: DEFIBRILLATION ELECTRODES

## (undated) DEVICE — CNTRL SLD GATA-3

## (undated) DEVICE — DECANTER: Brand: UNBRANDED

## (undated) DEVICE — SET PRIMARY GRVTY 10DP MALE LL 104IN

## (undated) DEVICE — MEDI-VAC YANKAUER SUCTION HANDLE W/BULBOUS TIP: Brand: CARDINAL HEALTH

## (undated) DEVICE — CATH DIAG EXPO .056 AL2 6F 100CM

## (undated) DEVICE — LIMB HOLDER, WRIST/ANKLE: Brand: DEROYAL

## (undated) DEVICE — ST EXT IV SMARTSITE 2VLV SP M LL 5ML IV1

## (undated) DEVICE — DECANT BG O JET

## (undated) DEVICE — INTRO SHEATH ART/FEM ENGAGE .038 6F12CM

## (undated) DEVICE — ST EXT IV SMRTSTE 2VLV FIX M LL 6ML 41

## (undated) DEVICE — PLASMABLADE PS210-030S 3.0S LOCK: Brand: PLASMABLADE™

## (undated) DEVICE — PENCL E/S HNDSWCH ROCKRBTN HOLSTR 10FT

## (undated) DEVICE — CANN NASL CO2 DIVIDED A/

## (undated) DEVICE — CATH COURNARD DECCA CSL 6F120CM

## (undated) DEVICE — TUBING, SUCTION, 1/4" X 10', STRAIGHT: Brand: MEDLINE

## (undated) DEVICE — CATH DIAG EXPO .045 AL2 5F 100CM

## (undated) DEVICE — ELECTRD RETRN/GRND MEGADYNE SGL/PLT W/CORD 9FT DISP

## (undated) DEVICE — DIAGNOSTIC ELECTRODE CATHETER: Brand: WOVEN

## (undated) DEVICE — Device

## (undated) DEVICE — INTRO SHEATH FASTCATH SRO .038IN 8.5F 63CM

## (undated) DEVICE — DRSNG SURESITE123 4X4.8IN

## (undated) DEVICE — LEX ELECTRO PHYSIOLOGY: Brand: MEDLINE INDUSTRIES, INC.

## (undated) DEVICE — Device: Brand: EZ STEER

## (undated) DEVICE — BALN PRESS WEDGE 6F 110CM

## (undated) DEVICE — IRRIGATOR BULB ASEPTO 60CC STRL

## (undated) DEVICE — ADULT NASAL CO2 SAMPLING WITH O2 DELIVERY CANNULA FOR CAPNOFLEX MODULE: Brand: VITAL SIGNS™

## (undated) DEVICE — INTRO SHEATH ENGAGE W/50 GW .038 7F12

## (undated) DEVICE — ADULT, W/LG. BACK PAD, RADIOTRANSPARENT ELEMENT AND LEAD WIRE COMPATIBLE W/: Brand: DEFIBRILLATION ELECTRODES

## (undated) DEVICE — RADIFOCUS TORQUE DEVICE MULTI-TORQUE VISE: Brand: RADIFOCUS TORQUE DEVICE

## (undated) DEVICE — RADIFOCUS GLIDEWIRE: Brand: GLIDEWIRE